# Patient Record
Sex: FEMALE | Race: BLACK OR AFRICAN AMERICAN | Employment: OTHER | URBAN - METROPOLITAN AREA
[De-identification: names, ages, dates, MRNs, and addresses within clinical notes are randomized per-mention and may not be internally consistent; named-entity substitution may affect disease eponyms.]

---

## 2017-01-17 ENCOUNTER — OFFICE VISIT (OUTPATIENT)
Dept: INTERNAL MEDICINE CLINIC | Age: 57
End: 2017-01-17

## 2017-01-17 VITALS
HEART RATE: 85 BPM | DIASTOLIC BLOOD PRESSURE: 84 MMHG | OXYGEN SATURATION: 99 % | SYSTOLIC BLOOD PRESSURE: 146 MMHG | WEIGHT: 184.2 LBS | BODY MASS INDEX: 32.64 KG/M2 | TEMPERATURE: 98.4 F | HEIGHT: 63 IN | RESPIRATION RATE: 16 BRPM

## 2017-01-17 DIAGNOSIS — E55.9 VITAMIN D DEFICIENCY: ICD-10-CM

## 2017-01-17 DIAGNOSIS — R73.03 PREDIABETES: ICD-10-CM

## 2017-01-17 DIAGNOSIS — E66.9 OBESITY (BMI 30-39.9): ICD-10-CM

## 2017-01-17 DIAGNOSIS — E78.2 MIXED HYPERLIPIDEMIA: ICD-10-CM

## 2017-01-17 DIAGNOSIS — Z23 NEED FOR TDAP VACCINATION: ICD-10-CM

## 2017-01-17 DIAGNOSIS — Z00.00 WELL WOMAN EXAM (NO GYNECOLOGICAL EXAM): Primary | ICD-10-CM

## 2017-01-17 RX ORDER — ERGOCALCIFEROL 1.25 MG/1
50000 CAPSULE ORAL
Qty: 8 CAP | Refills: 0 | Status: SHIPPED | OUTPATIENT
Start: 2017-01-17 | End: 2017-03-14

## 2017-01-17 NOTE — PROGRESS NOTES
Patient received Tdap in office today. Administered Boostrix 0.5 ml in left deltoid, IM. Pt tolerated well.

## 2017-01-17 NOTE — PROGRESS NOTES
HISTORY OF PRESENT ILLNESS  Yvonne Márquez is a 56 y.o. female. HPI  Health Maintenance   Topic Date Due    DTaP/Tdap/Td series (1 - Tdap) 01/14/1983    BREAST CANCER SCRN MAMMOGRAM  03/10/2016    INFLUENZA AGE 9 TO ADULT  08/01/2016    FOBT Q 1 YEAR AGE 50-75  11/09/2017    PAP AKA CERVICAL CYTOLOGY  09/15/2019    Hepatitis C Screening  Completed     Cardiovascular Review:  The patient has obesity Body mass index is 32.47 kg/(m^2). due to excess calories   She is ready to change her lifestyle and had made the following changes:  -She has joined a gym with sons plans to go 3 weeks   -She plans to change her eating habits   Diet and Lifestyle: nonsmoker  Home BP Monitoring: is not measured at home. Pertinent ROS: no TIA's, no chest pain on exertion, no dyspnea on exertion, no swelling of ankles. Review of Systems   Constitutional: Negative for chills, fever and weight loss. HENT: Negative for congestion and sore throat. Eyes: Negative for blurred vision and double vision. Respiratory: Negative for cough and shortness of breath. Cardiovascular: Negative for chest pain, orthopnea and leg swelling. Gastrointestinal: Negative for abdominal pain, blood in stool, constipation, diarrhea, heartburn, nausea and vomiting. Genitourinary: Negative for frequency and urgency. Musculoskeletal: Negative for joint pain and myalgias. Neurological: Negative for dizziness, tremors, weakness and headaches. Endo/Heme/Allergies: Does not bruise/bleed easily. Psychiatric/Behavioral: Negative for depression and suicidal ideas.      Patient Active Problem List    Diagnosis Date Noted    Obesity (BMI 30-39.9) 10/26/2015       Current Outpatient Prescriptions   Medication Sig Dispense Refill    DIVIGEL 1 mg (0.1 %) glpk USE 1 PACKET AS DIRECTED  11    PREMARIN 0.625 mg/gram vaginal cream INSERT 0.5GRAM VAGINALLY TWICE WEEKLY  1       Allergies   Allergen Reactions    Asa-Acetaminophen-Caff-Buffers Nausea and Vomiting    Pcn [Penicillins] Hives      Visit Vitals    /84 (BP 1 Location: Right arm, BP Patient Position: Sitting)    Pulse 85    Temp 98.4 °F (36.9 °C) (Oral)    Resp 16    Ht 5' 3.15\" (1.604 m)    Wt 184 lb 3.2 oz (83.6 kg)    SpO2 99%    BMI 32.47 kg/m2       Physical Exam   Constitutional: She is oriented to person, place, and time. She appears well-developed and well-nourished. HENT:   Right Ear: External ear normal.   Left Ear: External ear normal.   Mouth/Throat: Oropharynx is clear and moist. No oropharyngeal exudate. Eyes: Conjunctivae are normal. No scleral icterus. Neck: Normal range of motion. Neck supple. No thyromegaly present. Cardiovascular: Normal rate, regular rhythm and normal heart sounds. Exam reveals no gallop and no friction rub. No murmur heard. Pulmonary/Chest: Effort normal and breath sounds normal. No respiratory distress. She has no wheezes. She has no rales. She exhibits no tenderness. Abdominal: Soft. Bowel sounds are normal. She exhibits no distension. There is no tenderness. There is no rebound and no guarding. Genitourinary:   Genitourinary Comments: Up to date, completed by gynecologist.       Musculoskeletal: Normal range of motion. She exhibits no edema or tenderness. Neurological: She is alert and oriented to person, place, and time. Psychiatric: She has a normal mood and affect. ASSESSMENT and PLAN  Christie Mares was seen today for well woman. Diagnoses and all orders for this visit:    Well woman exam (no gynecological exam)- Health Maintenance reviewed - mammogram ordered, patient to schedule appointment, tetanus booster given. Labs reviewed     -     [facility] MAMMO BI SCREENING INCL CAD; Future    Obesity (BMI 30-39. 9)- I have reviewed/discussed the above normal BMI with the patient. I have recommended the following interventions: dietary management education, guidance, and counseling .  The plan is as follows: I have counseled this patient on diet and exercise regimens. .        Need for Tdap vaccination- given in clinic  -     TETANUS, DIPHTHERIA TOXOIDS AND ACELLULAR PERTUSSIS VACCINE (TDAP), IN INDIVIDS. >=7, IM  -     OR IMMUNIZ ADMIN,1 SINGLE/COMB VAC/TOXOID    Vitamin D deficiency- -Your vitamin Dis  low--> I order high dose vitamin D which you will take weekly for 8 weeks. After 8 weeks start an over the counter vitamin D supplement 800-1000IU/ day. Prediabetes- optimize diet. See AVS for full details of plan and patient discussion. Mixed hyperlipidemia- trial of TLC. Statin not indicated. Elevated BP- no known HTN. Recheck       Follow-up Disposition:  Return in about 4 months (around 5/17/2017) for Follow up- A1c. Medication risks/benefits/costs/interactions/alternatives discussed with patient. Bremclisbet Cheyanne  was given an after visit summary which includes diagnoses, current medications, & vitals. she expressed understanding with the diagnosis and plan.

## 2017-01-17 NOTE — PATIENT INSTRUCTIONS
It was a pleasure to see you! As discussed:    Lab Review  -Your A1c or 3 month marker of your blood sugar is mildly elevated, indicating prediabetes we will recheck this level at your next appointment. In the meantime work on optimizing your diet as we discussed at your appointment.   -Your cholesterol is elevated but fortunately based on your risk factors a statin is not indicated. Continue to work on optimizing your weight (goal lose at least 5% body weight), healthy eating and cardiovascular disease (Recommendation: reduce carbs to 150-200g/ day and the Mediterranean Diet). -Your vitamin Dis  low--> I order high dose vitamin D which you will take weekly for 8 weeks. After 8 weeks start an over the counter vitamin D supplement 800-1000IU/ day. The remainder of your labs were normal. Some labs that may have been tested and their explanation are:  Your electrolytes, kidney & liver function (Metabolic Panel)   Anemia, blood cells (CBC)  Thyroid (TSH + T4, T3)  Hormones (prolactin, vitamin D )   Hepatitis C AB (Hepatitis C Screening)    You received the tetanus/pertussis vaccine today. Please see vaccine handout for more information and let us know if you have any reactions or concerns after the vaccination. I'm excited you are committing to a healthier lifestyle. Here are some tips to help you:   Recommended \"Diets\"  Choose a healthy eating plan that works best for you. Some ideas are:  Whole 30 Diet: http://whole30.com/  Mediterranean Diet: ResidentialBook.de  Low Carb Diet: LifeHead.nl  Fast Metabolism Diet: http://[employer]/books/the-fast-metabolism-diet/  If you are not following one of these diets.   -Calorie counting is the best strategy for long term weight loss.  Until you are ready to start you can use Rolf's Ladder as a guide to what foods to eat for weight loss. - When you start counting calories, I recommend using an jericho such as MyMicroEvalPal.com or LoseIt.com  -Week one: record all your calories without altering your diet. Week 2: decrease your calories by 500 calories, continue each week till you at at your goal calorie intake 2286-0537 calories. -Go to the website: chooseResonergy.[employer] or health.gov to find more healthy eating tips      Well Visit, Women 50 to 72: Care Instructions  Your Care Instructions  Physical exams can help you stay healthy. Your doctor has checked your overall health and may have suggested ways to take good care of yourself. He or she also may have recommended tests. At home, you can help prevent illness with healthy eating, regular exercise, and other steps. Follow-up care is a key part of your treatment and safety. Be sure to make and go to all appointments, and call your doctor if you are having problems. It's also a good idea to know your test results and keep a list of the medicines you take. How can you care for yourself at home? · Reach and stay at a healthy weight. This will lower your risk for many problems, such as obesity, diabetes, heart disease, and high blood pressure. · Get at least 30 minutes of exercise on most days of the week. Walking is a good choice. You also may want to do other activities, such as running, swimming, cycling, or playing tennis or team sports. · Do not smoke. Smoking can make health problems worse. If you need help quitting, talk to your doctor about stop-smoking programs and medicines. These can increase your chances of quitting for good. · Protect your skin from too much sun. When you're outdoors from 10 a.m. to 4 p.m., stay in the shade or cover up with clothing and a hat with a wide brim. Wear sunglasses that block UV rays. Even when it's cloudy, put broad-spectrum sunscreen (SPF 30 or higher) on any exposed skin.   · See a dentist one or two times a year for checkups and to have your teeth cleaned. · Wear a seat belt in the car. · Limit alcohol to 1 drink a day. Too much alcohol can cause health problems. Follow your doctor's advice about when to have certain tests. These tests can spot problems early. · Cholesterol. Your doctor will tell you how often to have this done based on your age, family history, or other things that can increase your risk for heart attack and stroke. · Blood pressure. Have your blood pressure checked during a routine doctor visit. Your doctor will tell you how often to check your blood pressure based on your age, your blood pressure results, and other factors. · Mammogram. Ask your doctor how often you should have a mammogram, which is an X-ray of your breasts. A mammogram can spot breast cancer before it can be felt and when it is easiest to treat. · Pap test and pelvic exam. Ask your doctor how often you should have a Pap test. You may not need to have a Pap test as often as you used to. · Vision. Have your eyes checked every year or two or as often as your doctor suggests. Some experts recommend that you have yearly exams for glaucoma and other age-related eye problems starting at age 48. · Hearing. Tell your doctor if you notice any change in your hearing. You can have tests to find out how well you hear. · Diabetes. Ask your doctor whether you should have tests for diabetes. · Colon cancer. You should begin tests for colon cancer at age 48. You may have one of several tests. Your doctor will tell you how often to have tests based on your age and risk. Risks include whether you already had a precancerous polyp removed from your colon or whether your parents, sisters and brothers, or children have had colon cancer. · Thyroid disease. Talk to your doctor about whether to have your thyroid checked as part of a regular physical exam. Women have an increased chance of a thyroid problem. · Osteoporosis.  You should begin tests for bone density at age 72. If you are younger than 72, ask your doctor whether you have factors that may increase your risk for this disease. You may want to have this test before age 72. · Heart attack and stroke risk. At least every 4 to 6 years, you should have your risk for heart attack and stroke assessed. Your doctor uses factors such as your age, blood pressure, cholesterol, and whether you smoke or have diabetes to show what your risk for a heart attack or stroke is over the next 10 years. When should you call for help? Watch closely for changes in your health, and be sure to contact your doctor if you have any problems or symptoms that concern you. Where can you learn more? Go to http://kahlil-ale.info/. Enter V699 in the search box to learn more about \"Well Visit, Women 50 to 72: Care Instructions. \"  Current as of: July 19, 2016  Content Version: 11.1  © 3815-9287 [employer]. Care instructions adapted under license by [employer] (which disclaims liability or warranty for this information). If you have questions about a medical condition or this instruction, always ask your healthcare professional. [place] [address] any warranty or liability for your use of this information.

## 2017-01-30 ENCOUNTER — HOSPITAL ENCOUNTER (OUTPATIENT)
Dept: MAMMOGRAPHY | Age: 57
Discharge: HOME | End: 2017-01-30
Attending: INTERNAL MEDICINE
Payer: COMMERCIAL

## 2017-01-30 DIAGNOSIS — Z00.00 WELL WOMAN EXAM (NO GYNECOLOGICAL EXAM): ICD-10-CM

## 2017-01-30 PROCEDURE — 77067 SCR MAMMO BI INCL CAD: CPT

## 2017-06-01 ENCOUNTER — OFFICE VISIT (OUTPATIENT)
Dept: INTERNAL MEDICINE CLINIC | Age: 57
End: 2017-06-01

## 2017-06-01 VITALS
TEMPERATURE: 97.8 F | DIASTOLIC BLOOD PRESSURE: 68 MMHG | SYSTOLIC BLOOD PRESSURE: 118 MMHG | BODY MASS INDEX: 29.16 KG/M2 | HEIGHT: 63 IN | RESPIRATION RATE: 14 BRPM | WEIGHT: 164.6 LBS | OXYGEN SATURATION: 99 % | HEART RATE: 60 BPM

## 2017-06-01 DIAGNOSIS — R73.09 ELEVATED HEMOGLOBIN A1C: ICD-10-CM

## 2017-06-01 DIAGNOSIS — E78.2 MIXED HYPERLIPIDEMIA: Primary | ICD-10-CM

## 2017-06-01 DIAGNOSIS — R63.4 WEIGHT LOSS: ICD-10-CM

## 2017-06-01 LAB — HBA1C MFR BLD HPLC: 5.6 %

## 2017-06-01 NOTE — PROGRESS NOTES
Chief Complaint   Patient presents with    Cholesterol Problem     1. Have you been to the ER, urgent care clinic since your last visit? Hospitalized since your last visit? Yes When: 5/2017 [facility] Reason for visit: Sinus Infection    2. Have you seen or consulted any other health care providers outside of the [address] [place] [address] since your last visit? Include any pap smears or colon screening.  No    Dark spot on upper left calf

## 2017-06-01 NOTE — PROGRESS NOTES
HISTORY OF PRESENT ILLNESS  Lauro Childress is a 56 y.o. female. HPI  Cardiovascular Review:  The patient has prediabetes, HLD and Body mass index is 29.02 kg/(m^2). lost 20lbs. Diet and Lifestyle: follows a diabetic diet regularly, exercises regularly, nonsmoker  Home BP Monitoring: is not measured at home. Pertinent ROS: no TIA's, no chest pain on exertion, no dyspnea on exertion, no swelling of ankles. Review of Systems   Constitutional: Negative for diaphoresis, fever and weight loss. HENT:        Recovered frm Sinusitis seen at patient    Eyes: Negative for blurred vision and pain. Respiratory: Negative for shortness of breath. Cardiovascular: Negative for chest pain, orthopnea and leg swelling. Skin:        Left leg hematoma- tender   Neurological: Negative for focal weakness and headaches. Endo/Heme/Allergies: Bruises/bleeds easily. Psychiatric/Behavioral: Negative for depression. Patient Active Problem List    Diagnosis Date Noted    Prediabetes 01/17/2017    Mixed hyperlipidemia 01/17/2017    Obesity (BMI 30-39.9) 10/26/2015       Current Outpatient Prescriptions   Medication Sig Dispense Refill    DIVIGEL 1 mg (0.1 %) glpk USE 1 PACKET AS DIRECTED  11    PREMARIN 0.625 mg/gram vaginal cream INSERT 0.5GRAM VAGINALLY TWICE WEEKLY  1       Allergies   Allergen Reactions    Asa-Acetaminophen-Caff-Buffers Nausea and Vomiting    Pcn [Penicillins] Hives      Visit Vitals    /68 (BP 1 Location: Right arm, BP Patient Position: Sitting)    Pulse 60    Temp 97.8 °F (36.6 °C) (Oral)    Resp 14    Ht 5' 3.15\" (1.604 m)    Wt 164 lb 9.6 oz (74.7 kg)    SpO2 99%    BMI 29.02 kg/m2       Physical Exam   Constitutional: She is oriented to person, place, and time. She appears well-developed. No distress. Eyes: Conjunctivae are normal.   Neck: Neck supple. No thyromegaly present. Cardiovascular: Normal rate, regular rhythm and normal heart sounds.     Pulmonary/Chest: Effort normal and breath sounds normal. No respiratory distress. She has no wheezes. She has no rales. She exhibits no tenderness. Lymphadenopathy:     She has no cervical adenopathy. Neurological: She is alert and oriented to person, place, and time. Skin: Skin is warm. Psychiatric: She has a normal mood and affect. ASSESSMENT and PLAN  Lorene Love was seen today for cholesterol problem. Diagnoses and all orders for this visit:    Mixed hyperlipidemia- check lipids anticipate improvement give weight loss. -     LIPID PANEL  -     METABOLIC PANEL, COMPREHENSIVE    Elevated hemoglobin A1c- now wnl with lifestyle changes. -     AMB POC HEMOGLOBIN A1C    Weight loss- lost 20lbs via diet change. I have reviewed/discussed the above normal BMI with the patient. I have recommended the following interventions: dietary management education, guidance, and counseling . Mi Oden Follow-up Disposition:  Return in about 8 months (around 1/15/2018) for Physical - 30 minute appointment. Medication risks/benefits/costs/interactions/alternatives discussed with patient. Michel Finchciarra  was given an after visit summary which includes diagnoses, current medications, & vitals. she expressed understanding with the diagnosis and plan.

## 2017-06-01 NOTE — PATIENT INSTRUCTIONS
It was a pleasure to see you! As discussed:    Congratulations on your weight loss and positive lifestyle changes!!! Health Maintenance   I have ordered your age appropriate labs please complete them. You will need to fast 10-12hrs before your appointment. Great job on [employer] and exercising! Remember goal for exercise is 150minutes of moderate exercise a day and diet goal is to eat 50% fruits and vegetables with minimal sugar, fat and oil daily. See health.gov or choosemyplate.gov for more details. Your cervical cancer and breast cancer screening will be completed by your ob/ gyn as scheduled. Learning About Carbohydrate  What is carbohydrate? Carbohydrate is an important nutrient you get from food. It's a great source of energy for your body and helps your brain and nervous system work properly. How does your body use carbohydrate? After you eat food with carbs in it, your body digests the carbohydrate and turns it into a kind of sugar that goes into your blood. The blood carries this sugar to the cells in your body. The cells use the sugar to give you energy. Extra sugar is stored in the cells for later use. If it isn't used, it turns into fat. Where does carbohydrate come from? The healthiest carbohydrate choices are breads, cereals, and pastas made with whole grains; brown rice; low-fat dairy products; vegetables; legumes such as peas, lentils, and beans; and fruits. Foods made from refined flour, including bread, pasta, doughnuts, cookies, and desserts, also contain carbohydrate. So do sweets such as candy and soda. How can you get the right kind and amount of carbs? Eating too much of anything can lead to weight gain. And that can lead to other health problems. Here are some tips to help you eat the right amount of the right kind of carbs so you have the nutrition and the energy you need:  · Eat 3 to 8 servings of grains (breads, cereals, rice, pasta) each day.  For example, a serving is 1 slice of bread, 1 cup of boxed cereal, or ½ cup of cooked rice, cooked pasta, or cooked cereal. Go to www. choosemyplate.gov to learn how many servings you need. ¨ Buy bread that lists whole wheat (or other whole grains), stone-ground wheat, or cracked wheat as the first ingredient. ¨ Eat brown rice, bulgur, or millet instead of white rice. ¨ Eat pasta and cereals made from whole grain flour instead of refined flour. · Eat several servings a day of fresh fruits and vegetables. These include raspberries, apples, figs, oranges, pears, prunes, broccoli, brussels sprouts, carrots, corn, peas, and beans. And there are lots of other fruits and vegetables to choose from. · Limit the amount of candy, desserts, and soda in your diet. Where can you learn more? Go to http://kahlil-ale.info/. Enter F199 in the search box to learn more about \"Learning About Carbohydrate. \"  Current as of: July 26, 2016  Content Version: 11.2  © 8374-2959 [employer]. Care instructions adapted under license by [employer] (which disclaims liability or warranty for this information). If you have questions about a medical condition or this instruction, always ask your healthcare professional. [address] any warranty or liability for your use of this information.

## 2020-02-17 ENCOUNTER — HOSPITAL ENCOUNTER (OUTPATIENT)
Dept: MAMMOGRAPHY | Age: 60
Discharge: HOME | End: 2020-02-17
Attending: OBSTETRICS & GYNECOLOGY
Payer: COMMERCIAL

## 2020-02-17 DIAGNOSIS — Z12.31 VISIT FOR SCREENING MAMMOGRAM: ICD-10-CM

## 2020-02-17 PROCEDURE — 77067 SCR MAMMO BI INCL CAD: CPT

## 2020-10-30 ENCOUNTER — VIRTUAL VISIT (OUTPATIENT)
Dept: PRIMARY CARE CLINIC | Age: 60
End: 2020-10-30
Payer: COMMERCIAL

## 2020-10-30 DIAGNOSIS — R11.0 NAUSEA: ICD-10-CM

## 2020-10-30 DIAGNOSIS — R63.0 LOSS OF APPETITE: ICD-10-CM

## 2020-10-30 DIAGNOSIS — U07.1 COVID-19: Primary | ICD-10-CM

## 2020-10-30 DIAGNOSIS — J02.9 SORE THROAT: ICD-10-CM

## 2020-10-30 PROCEDURE — 99204 OFFICE O/P NEW MOD 45 MIN: CPT | Performed by: FAMILY MEDICINE

## 2020-10-30 RX ORDER — ONDANSETRON 4 MG/1
4 TABLET, ORALLY DISINTEGRATING ORAL
Qty: 10 TAB | Refills: 0 | Status: SHIPPED | OUTPATIENT
Start: 2020-10-30

## 2020-10-30 NOTE — PATIENT INSTRUCTIONS
Oral Rehydration: Care Instructions Your Care Instructions Dehydration occurs when your body loses too much water. This can happen if you do not drink enough fluids or lose a lot of fluid due to diarrhea, vomiting, or sweating. Being dehydrated can cause health problems and can even be life-threatening. To replace lost fluids, you need to drink liquid that contains special chemicals called electrolytes. Electrolytes keep your body working well. Plain water does not have electrolytes. You also need to rest to prevent more fluid loss. Replacing water and electrolytes (oral rehydration) completely takes about 36 hours. But you should feel better within a few hours. Follow-up care is a key part of your treatment and safety. Be sure to make and go to all appointments, and call your doctor if you are having problems. It's also a good idea to know your test results and keep a list of the medicines you take. How can you care for yourself at home? · Take frequent sips of a drink such as Gatorade, Powerade, or other rehydration drinks that your doctor suggests. These replace both fluid and important chemicals (electrolytes) you need for balance in your blood. · Drink 2 quarts of cool liquid over 2 to 4 hours. You should have at least 10 glasses of liquid a day to replace lost fluid. If you have kidney, heart, or liver disease and have to limit fluids, talk with your doctor before you increase the amount of fluids you drink. · Make your own drink. Measure everything carefully. The drink may not work well or may even be harmful if the amounts are off. ? 1 quart water ? ½ teaspoon salt ? 6 teaspoons sugar · Do not drink liquid with caffeine, such as coffee and wili. · Do not drink any alcohol. It can make you dehydrated. · Drink plenty of fluids, enough so that your urine is light yellow or clear like water.  If you have kidney, heart, or liver disease and have to limit fluids, talk with your doctor before you increase the amount of fluids you drink. When should you call for help? Call 911 anytime you think you may need emergency care. For example, call if: 
  · You have signs of severe dehydration, such as: 
? You are confused or unable to stay awake. 
? You passed out (lost consciousness). Call your doctor now or seek immediate medical care if: 
  · You still have signs of dehydration. You have sunken eyes and a dry mouth, and you pass only a little dark urine.  
  · You are dizzy or lightheaded, or you feel like you may faint.  
  · You are not able to keep down fluids. Watch closely for changes in your health, and be sure to contact your doctor if: 
  · You do not get better as expected. Where can you learn more? Go to http://www.gray.com/ Enter I040 in the search box to learn more about \"Oral Rehydration: Care Instructions. \" Current as of: June 26, 2019               Content Version: 12.6 © 1465-9845 [employer]. Care instructions adapted under license by [employer] (which disclaims liability or warranty for this information). If you have questions about a medical condition or this instruction, always ask your healthcare professional. [place] [address] any warranty or liability for your use of this information. 
  
 Learning About Coronavirus [phone]) Coronavirus (FHUVY-07): Overview What is coronavirus (RWBZP-67)? The coronavirus disease (COVID-19) is caused by a virus. It is an illness that was first found in December 2019. It has since spread worldwide. The virus can cause fever, cough, and trouble breathing. In severe cases, it can cause pneumonia and make it hard to breathe without help. It can cause death. This virus spreads person-to-person through droplets from coughing and sneezing.  It can also spread when you are close to someone who is infected. And it can spread when you touch something that has the virus on it, such as a doorknob or a tabletop. Coronaviruses are a large group of viruses. They cause the common cold. They also cause more serious illnesses like Middle East respiratory syndrome (MERS) and severe acute respiratory syndrome (SARS). COVID-19 is caused by a novel coronavirus. That means it's a new type that has not been seen in people before. How is COVID-19 treated? Mild illness can be treated at home, but more serious illness needs to be treated in the hospital. Treatment may include medicines to reduce symptoms, plus breathing support such as oxygen therapy or a ventilator. Other treatments, such as antiviral medicines, may help people who have COVID-19. What can you do to protect yourself from COVID-19? The best way to protect yourself from getting sick is to: · Avoid areas where there is an outbreak. · Avoid contact with people who may be infected. · Avoid crowds and try to stay at least 6 feet away from other people. · Wash your hands often, especially after you cough or sneeze. Use soap and water, and scrub for at least 20 seconds. If soap and water aren't available, use an alcohol-based hand . · Avoid touching your mouth, nose, and eyes. What can you do to avoid spreading the virus to others? To help avoid spreading the virus to others: 
· Wash your hands often with soap or alcohol-based hand sanitizers. · Cover your mouth with a tissue when you cough or sneeze. Then throw the tissue in the trash. · Use a disinfectant to clean things that you touch often. These include doorknobs, remote controls, phones, and handles on your refrigerator and microwave. And don't forget countertops, tabletops, bathrooms, and computer keyboards. · Wear a cloth face cover if you have to go to public areas. If you know or suspect that you have COVID-19: 
· Stay home. Don't go to school, work, or public areas.  And don't use public transportation, ride-shares, or taxis unless you have no choice. · Leave your home only if you need to get medical care or testing. But call the doctor's office first so they know you're coming. And wear a face cover. · Limit contact with people in your home. If possible, stay in a separate bedroom and use a separate bathroom. · Wear a face cover whenever you're around other people. It can help stop the spread of the virus when you cough or sneeze. · Clean and disinfect your home every day. Use household  and disinfectant wipes or sprays. Take special care to clean things that you grab with your hands. · Self-isolate until it's safe to be around others again. ? If you have symptoms, it's safe when you haven't had a fever for 3 days and your symptoms have improved and it's been at least 10 days since your symptoms started. ? If you were exposed to the virus but don't have symptoms, it's safe to be around others 14 days after exposure. ? Talk to your doctor about whether you also need testing, especially if you have a weakened immune system. When to call for help Call 911 anytime you think you may need emergency care. For example, call if: 
· You have severe trouble breathing. (You can't talk at all.) · You have constant chest pain or pressure. · You are severely dizzy or lightheaded. · You are confused or can't think clearly. · Your face and lips have a blue color. · You passed out (lost consciousness) or are very hard to wake up. Call your doctor now if you develop symptoms such as: · Shortness of breath. · Fever. · Cough. If you need to get care, call ahead to the doctor's office for instructions before you go. Make sure you wear a face cover to prevent exposing other people to the virus. Where can you get the latest information? The following health organizations are tracking and studying this virus. Their websites contain the most up-to-date information.  Valarie Hernandez also learn what to do if you think you may have been exposed to the virus. · U.S. Centers for Disease Control and Prevention (CDC): The CDC provides updated news about the disease and travel advice. The website also tells you how to prevent the spread of infection. www.cdc.gov · World Health Organization [facility]): WHO offers information about the virus outbreaks. WHO also has travel advice. www.who.int 
Current as of: July 10, 2020               Content Version: 12.6 © 2006-2020 [employer]. Care instructions adapted under license by [employer] (which disclaims liability or warranty for this information).  If you have questions about a medical condition or this instruction, always ask your healthcare professional. [address] any warranty or liability for your use of this information.

## 2020-10-30 NOTE — LETTER
NOTIFICATION RETURN TO WORK / SCHOOL 
 
10/30/2020 Ms. Getachew Borjas [address] [facility][note 3] [id number] To Whom It May Concern: 
 
Getachew Borjas was seen virtually by [facility] 10/30/2020. She may return to work on 11/9. I recommend: she not return to work until she has been fever free for 24 hours without antipyretics. If there are questions or concerns, please have the patient contact our office. Sincerely, Tony Harper, DO

## 2020-10-30 NOTE — PROGRESS NOTES
Juju Butler  64 y.o. female  1960  [facility]  [id number]   [facility]   Telemedicine Progress Note  Rebecca Campbell DO       Encounter Date and Time: October 30, 2020 at 2:44 PM    Consent: Juju Butler, who was seen by synchronous (real-time) audio-video technology, and/or her healthcare decision maker, is aware that this patient-initiated, Telehealth encounter on 10/30/2020 is a billable service, with coverage as determined by her insurance carrier. She is aware that she may receive a bill and has provided verbal consent to proceed: Yes. Chief Complaint   Patient presents with    Concern For COVID-19 (Coronavirus)     History of Present Illness   Juju Butler is a 64 y.o. female was evaluated by synchronous (real-time) audio-video technology from home, through a secure patient portal.    Woke up with sx last Monday 10/19. Sx include fatigue, fevers, overall feeling weak, loss of appetite. Received confirmation at [facility] Thursday 10/22 she was positive for [address].  was also diagnosed this same day but ha since recovered. He accompanied her on the video call and states he is most concerned about her appetite and nourishment. States she has been eating mainly crackers, ginger ale and occasional orange juice. Keeping 2-3 glasses of water down. States she is urinating but less. She states she has nausea and sore throat which makes her not want to eat. She has been taking Tylenol which brings down her fever and helps her sleep well. She has a cough. No history of asthma or lung disease. She is a non smoker. Denies any issues with chest pain, shortness of breath or trouble breathing, confusion. Needs a note for work.      PMH - pre hypertension, seasonal allergies  Surg Hx - hysterectomy  Fam Hx - Mom has diabetes, HTN  Social Hx - , non smoker, occasional drinker    Review of Systems   Review of Systems   Constitutional: Positive for fever and malaise/fatigue. HENT: Positive for congestion and sore throat. Respiratory: Positive for cough and sputum production. Negative for shortness of breath. Cardiovascular: Negative for chest pain. Gastrointestinal: Positive for nausea. Negative for blood in stool. Genitourinary: Negative for frequency, hematuria and urgency. Musculoskeletal: Positive for myalgias. Neurological: Positive for weakness. Endo/Heme/Allergies: Positive for environmental allergies. Vitals/Objective:     Temp 99.3  Is not able to check any other vitals at home    General: fatigued, cooperative, no distress   Mental  status: mental status: alert, oriented to person, place, and time, normal mood, behavior, speech, dress, motor activity, and thought processes, drowsy   Resp: resp: normal effort, no respiratory distress, no accessory muscle use and coughing - dry   Neuro: neuro: no gross deficits   Skin: skin: no discoloration or lesions of concern on visible areas   Due to this being a TeleHealth evaluation, many elements of the physical examination are unable to be assessed. Assessment and Plan:   Time-based coding, delete if not needed: I spent at least 30 minutes with this new patient, and >50% of the time was spent counseling and/or coordinating care regarding COVID 19    1. COVID 19 - discussed alarming signs and sx including difficulty breathing, chest pain, confusion, lethargy, difficulty arousing, turning blue she needs to go to ER immediately. Patient is able to eat and drink although less then normal and is urinating. Recommend she increase hydration to preferably 6 bottles of water daily and stick to bland BRAT diet. Discussed if she is not able to hydrate or keep fluids down she needs to go to ER for IVF. Will send Zofran to help with nausea. Recommend Cepacol for sore throat, continue Tylenol PRN fever/ myalgias, Claritin and Flonase for congestion sx.  Discussed per CDC guidelines since she has had a longer course she should quarantine 20 days since symptom onset and be 24 hours fever free without any antipyretics. Will send letter in [address] [place] [address] [address]. Discussed if her sx are worsening, she develops any of the red flag symptoms discussed above, develops signs of dehydration and/or is not able to stay hydrated or has decreased PO intake she needs to go to ER immediately. All questions/ concerns were addressed. Patient is agreeable to plan. Time spent in direct conversation with the patient to include medical condition(s) discussed, assessment and treatment plan:    We discussed the expected course, resolution and complications of the diagnosis(es) in detail. Medication risks, benefits, costs, interactions, and alternatives were discussed as indicated. I advised her to contact the office if her condition worsens, changes or fails to improve as anticipated. She expressed understanding with the diagnosis(es) and plan. Patient understands that this encounter was a temporary measure, and the importance of further follow up and examination was emphasized. Patient verbalized understanding. Patient informed to follow up: if sx are not improving on Monday or to go to ER if sx are worsening    Electronically Signed: Karla Soler DO Alexx Cummings is a 64 y.o. female who was evaluated by an audio-video encounter for concerns as above. Patient identification was verified prior to start of the visit. A caregiver was present when appropriate. Due to this being a TeleHealth encounter (During ACE- public health emergency), evaluation of the following organ systems was limited: Vitals/Constitutional/EENT/Resp/CV/GI//MS/Neuro/Skin/Heme-Lymph-Imm.   Pursuant to the emergency declaration under the [facility] waiver authority and the [employer] and [employer] Act, this Virtual Visit was conducted, with patient's (and/or legal guardian's) consent, to reduce the patient's risk of exposure to COVID-19 and provide necessary medical care. Services were provided through a synchronous discussion virtually to substitute for in-person clinic visit. I was in the office. The patient was at home. History   Patients past medical, surgical and family histories were reviewed and updated. Past Medical History:   Diagnosis Date    Environmental and seasonal allergies      Past Surgical History:   Procedure Laterality Date    HX GYN      hysterectomy      Family History   Problem Relation Age of Onset    Hypertension Mother     Diabetes Mother      Social History     Socioeconomic History    Marital status: UNKNOWN     Spouse name: Not on file    Number of children: Not on file    Years of education: Not on file    Highest education level: Not on file   Occupational History    Not on file   Social Needs    Financial resource strain: Not on file    Food insecurity     Worry: Not on file     Inability: Not on file    Transportation needs     Medical: Not on file     Non-medical: Not on file   Tobacco Use    Smoking status: Never Smoker    Smokeless tobacco: Never Used   Substance and Sexual Activity    Alcohol use:  Yes     Alcohol/week: 0.0 standard drinks     Comment: a few drinks a month    Drug use: Never    Sexual activity: Not on file   Lifestyle    Physical activity     Days per week: Not on file     Minutes per session: Not on file    Stress: Not on file   Relationships    Social connections     Talks on phone: Not on file     Gets together: Not on file     Attends Buddhism service: Not on file     Active member of club or organization: Not on file     Attends meetings of clubs or organizations: Not on file     Relationship status: Not on file    Intimate partner violence     Fear of current or ex partner: Not on file     Emotionally abused: Not on file     Physically abused: Not on file     Forced sexual activity: Not on file   Other Topics Concern    Not on file   Social History Narrative    Not on file     Patient Active Problem List   Diagnosis Code    Prediabetes R73.03    Mixed hyperlipidemia E78.2          Current Medications/Allergies   Medications and Allergies reviewed:    Current Outpatient Medications   Medication Sig Dispense Refill    ondansetron (ZOFRAN ODT) 4 mg disintegrating tablet Take 1 Tab by mouth every eight (8) hours as needed for Nausea or Vomiting.  10 Tab 0    DIVIGEL 1 mg (0.1 %) glpk USE 1 PACKET AS DIRECTED  11    PREMARIN 0.625 mg/gram vaginal cream INSERT 0.5GRAM VAGINALLY TWICE WEEKLY  1     Allergies   Allergen Reactions    Asa-Acetaminophen-Caff-Buffers Nausea and Vomiting    Pcn [Penicillins] Hives

## 2020-11-02 ENCOUNTER — TELEPHONE (OUTPATIENT)
Dept: PRIMARY CARE CLINIC | Age: 60
End: 2020-11-02

## 2020-11-02 NOTE — TELEPHONE ENCOUNTER
11/2/2020     Spoke with patient Friday evening. Was feeling better and had been able to eat/ drink after Zofran. Called to check in on her today. States she went to the ER Saturday morning for difficulty breathing. Was admitted to [facility] and is now starting to feel better.     Carol Garcia, DO

## 2020-11-03 ENCOUNTER — TELEPHONE (OUTPATIENT)
Dept: PRIMARY CARE CLINIC | Age: 60
End: 2020-11-03

## 2020-11-03 NOTE — TELEPHONE ENCOUNTER
Patients  wanted to let Dr. Simona Rutledge know his wife went to [facility] to be tested for covid and she is doing much better and will be discharged today.  Would like to speak to Dr. Simona Rutledge to follow up

## 2020-11-24 ENCOUNTER — TELEPHONE (OUTPATIENT)
Dept: PRIMARY CARE CLINIC | Age: 60
End: 2020-11-24

## 2020-11-24 NOTE — TELEPHONE ENCOUNTER
Called patient to get more information regarding her disability papers. Left VM to call back.     Tony Harper, DO

## 2020-11-30 ENCOUNTER — TELEPHONE (OUTPATIENT)
Dept: PRIMARY CARE CLINIC | Age: 60
End: 2020-11-30

## 2020-11-30 NOTE — TELEPHONE ENCOUNTER
----- Message from Josue Ozuna sent at 11/30/2020 12:21 PM EST -----  Regarding: Dr Stacey Velasco  Patient return call    Caller's first and last name and relationship (if not the patient):      Best contact number(s): [phone]      Whose call is being returned: Dr Chino Fleming      Details to clarify the request: Pt is requesting a call back from Dr Chino Fleming and advised she is retruning Dr Curiel Nations call.  Pt advised she is in [address] [place] [address] due to [facility] hospitalization since 10/31/20      Josue Ozuna

## 2020-12-02 ENCOUNTER — TELEPHONE (OUTPATIENT)
Dept: PRIMARY CARE CLINIC | Age: 60
End: 2020-12-02

## 2020-12-02 NOTE — TELEPHONE ENCOUNTER
Spoke with patient. She is still in a rehab facility which I was not aware of. States she was discharged from the hospital and went to rehab 11/6/20. States that she has an expected DC date of 12/23. Discussed I am happy to fill out her paper work. She stated I had permission to speak to her  as it is difficult for her to remember all the details. Attempted to call  but he did not . Left .

## 2020-12-02 NOTE — TELEPHONE ENCOUNTER
Spoke with  who clarified she has never been discharged from the hospital and is still at [facility] [address]. States the Attending physician at the hospital is also filling out the disability paperwork but they need a copy from us as well. Discussed this has been faxed. He states her CM is Ryan Bustamante [phone] and he will let her know we need records. Will give her a call tomorrow.      Mat Fox, DO

## 2020-12-17 ENCOUNTER — TELEPHONE (OUTPATIENT)
Dept: PRIMARY CARE CLINIC | Age: 60
End: 2020-12-17

## 2020-12-17 NOTE — TELEPHONE ENCOUNTER
Spoke with Jens Anderson from [place] [address] [address] who are coordinating her care on discharge from [facility] and will fax her DC summary to me when she is discharged 12/22. States she is still on oxygen. Requires skilled nursing, PT and OT at discharge in the home.      Kenny Cost, DO

## 2020-12-28 ENCOUNTER — VIRTUAL VISIT (OUTPATIENT)
Dept: PRIMARY CARE CLINIC | Age: 60
End: 2020-12-28
Payer: COMMERCIAL

## 2020-12-28 DIAGNOSIS — R53.81 PHYSICAL DECONDITIONING: ICD-10-CM

## 2020-12-28 DIAGNOSIS — R06.02 SHORTNESS OF BREATH: ICD-10-CM

## 2020-12-28 DIAGNOSIS — U07.1 COVID-19: Primary | ICD-10-CM

## 2020-12-28 PROCEDURE — 99214 OFFICE O/P EST MOD 30 MIN: CPT | Performed by: FAMILY MEDICINE

## 2020-12-28 NOTE — PROGRESS NOTES
Valentin Edward  64 y.o. female  1960  [facility]  [id number]   [facility]   Telemedicine Progress Note  Payton Rush DO       Encounter Date and Time: December 28, 2020 at 11:46 AM    Consent: Valentin Edward, who was seen by synchronous (real-time) audio-video technology, and/or her healthcare decision maker, is aware that this patient-initiated, Telehealth encounter on 12/28/2020 is a billable service, with coverage as determined by her insurance carrier. She is aware that she may receive a bill and has provided verbal consent to proceed: Yes. Chief Complaint   Patient presents with   [facility] Follow Up     History of Present Illness   Valentin Edward is a 64 y.o. female was evaluated by synchronous (real-time) audio-video technology from home, through a secure patient portal [place] me. [facility] Follow Up  Was diagnosed with COVID end of October. Was hospitalized for 2 months at [facility]. Patient is unsure of what tests were done. Is extremely deconditioned since she left the hospital. Has home health care coming into the home 3x weekly. Sees PT/ OT and skilled nursing. States PT is coming today. Is supposed to set up appt with Pulm but has not done this yet. States she does not have chest pain but is winded extremely easily. States she is taking it one step at a time but is grateful for the care she received and that she is doing better. Has good support system at home and her  is very involved in her care. States she still has headaches and occasional dizziness if she stands up too quickly. Has learned to stand up slower. Review of Systems   Review of Systems   Constitutional: Negative for chills and fever. Respiratory: Positive for shortness of breath and wheezing. Cardiovascular: Negative for chest pain. Neurological: Positive for dizziness and headaches.      Vitals/Objective:     General: alert, cooperative, no distress   Mental  status: mental status: alert, oriented to person, place, and time, normal mood, behavior, speech, dress, motor activity, and thought processes   Resp: resp: normal effort and no respiratory distress   Neuro: neuro: no gross deficits   Skin: skin: no discoloration or lesions of concern on visible areas   Due to this being a TeleHealth evaluation, many elements of the physical examination are unable to be assessed. Assessment and Plan:   Time-based coding, delete if not needed: I spent at least 25 minutes with this established patient, and >50% of the time was spent counseling and/or coordinating care regarding COVID 19, deconditioning, shortness of breath    1. COVID-19  - Still have not received records. Will reach out to home health care agency to see if we can coordinate this. Likely needs follow up CXR based on last imaging.   - Referral to Pulm and Cards for follow up  - Continue working with PT/ OT   Spoke with patient's  with patient's permission who is also unsure of what was done in the hospital regarding cardiac workup. They state they were told to follow up with Cards but seem unsure why. Spoke with Dr. Jolie Trevizo who will coordinate testing and appointments with patient and family. State [facility] and follow up testing. 2. Shortness of breath  - Likely needs repeat CXR depending on when last imaging was done if she had PNA in the hospital. She is off O2. States her pulse ox is stable 97+. Continue monitoring vitals at home. 3. Physical deconditioning  - Continue PT/ OT. Was recently discharged from Rehab facility. Has walker and wheelchair already at home. Good support system.     Time spent in direct conversation with the patient to include medical condition(s) discussed, assessment and treatment plan: 25 min (coordinating care with patient,  and Dr. Jolie Trevizo)    We discussed the expected course, resolution and complications of the diagnosis(es) in detail. Medication risks, benefits, costs, interactions, and alternatives were discussed as indicated. I advised her to contact the office if her condition worsens, changes or fails to improve as anticipated. She expressed understanding with the diagnosis(es) and plan. Patient understands that this encounter was a temporary measure, and the importance of further follow up and examination was emphasized. Patient verbalized understanding. Electronically Signed: DO Cielo Layton is a 64 y.o. female who was evaluated by an audio-video encounter for concerns as above. Patient identification was verified prior to start of the visit. A caregiver was present when appropriate. Due to this being a TeleHealth encounter (During ZOJK-19 public health emergency), evaluation of the following organ systems was limited: Vitals/Constitutional/EENT/Resp/CV/GI//MS/Neuro/Skin/Heme-Lymph-Imm. Pursuant to the emergency declaration under the [facility], 1135 waiver authority and the [employer] Act, this Virtual Visit was conducted, with patient's (and/or legal guardian's) consent, to reduce the patient's risk of exposure to COVID-19 and provide necessary medical care. Services were provided through a synchronous discussion virtually to substitute for in-person clinic visit. I was in the office. The patient was at home. History   Patients past medical, surgical and family histories were reviewed and updated.       Past Medical History:   Diagnosis Date    Environmental and seasonal allergies      Past Surgical History:   Procedure Laterality Date    HX GYN      hysterectomy      Family History   Problem Relation Age of Onset    Hypertension Mother     Diabetes Mother      Social History     Socioeconomic History    Marital status: UNKNOWN     Spouse name: Not on file    Number of children: Not on file    Years of education: Not on file    Highest education level: Not on file   Occupational History    Not on file   Social Needs    Financial resource strain: Not on file    Food insecurity     Worry: Not on file     Inability: Not on file    Transportation needs     Medical: Not on file     Non-medical: Not on file   Tobacco Use    Smoking status: Never Smoker    Smokeless tobacco: Never Used   Substance and Sexual Activity    Alcohol use: Yes     Alcohol/week: 0.0 standard drinks     Comment: a few drinks a month    Drug use: Never    Sexual activity: Not on file   Lifestyle    Physical activity     Days per week: Not on file     Minutes per session: Not on file    Stress: Not on file   Relationships    Social connections     Talks on phone: Not on file     Gets together: Not on file     Attends Sabianism service: Not on file     Active member of club or organization: Not on file     Attends meetings of clubs or organizations: Not on file     Relationship status: Not on file    Intimate partner violence     Fear of current or ex partner: Not on file     Emotionally abused: Not on file     Physically abused: Not on file     Forced sexual activity: Not on file   Other Topics Concern    Not on file   Social History Narrative    Not on file     Patient Active Problem List   Diagnosis Code    Prediabetes R73.03    Mixed hyperlipidemia E78.2          Current Medications/Allergies   Medications and Allergies reviewed:    Current Outpatient Medications   Medication Sig Dispense Refill    ondansetron (ZOFRAN ODT) 4 mg disintegrating tablet Take 1 Tab by mouth every eight (8) hours as needed for Nausea or Vomiting.  10 Tab 0    DIVIGEL 1 mg (0.1 %) glpk USE 1 PACKET AS DIRECTED  11    PREMARIN 0.625 mg/gram vaginal cream INSERT 0.5GRAM VAGINALLY TWICE WEEKLY  1     Allergies   Allergen Reactions    Asa-Acetaminophen-Caff-Buffers Nausea and Vomiting    Pcn [Penicillins] Hives

## 2020-12-29 ENCOUNTER — TELEPHONE (OUTPATIENT)
Dept: CARDIOLOGY CLINIC | Age: 60
End: 2020-12-29

## 2021-01-07 ENCOUNTER — OFFICE VISIT (OUTPATIENT)
Dept: CARDIOLOGY CLINIC | Age: 61
End: 2021-01-07
Payer: COMMERCIAL

## 2021-01-07 DIAGNOSIS — R06.09 DOE (DYSPNEA ON EXERTION): Primary | ICD-10-CM

## 2021-01-07 DIAGNOSIS — Z86.16 HISTORY OF COVID-19: ICD-10-CM

## 2021-01-07 DIAGNOSIS — R01.1 SYSTOLIC MURMUR: ICD-10-CM

## 2021-01-07 DIAGNOSIS — E78.2 MIXED HYPERLIPIDEMIA: ICD-10-CM

## 2021-01-07 DIAGNOSIS — G93.31 POSTVIRAL FATIGUE SYNDROME: ICD-10-CM

## 2021-01-07 PROCEDURE — 93000 ELECTROCARDIOGRAM COMPLETE: CPT | Performed by: SPECIALIST

## 2021-01-07 PROCEDURE — 99244 OFF/OP CNSLTJ NEW/EST MOD 40: CPT | Performed by: SPECIALIST

## 2021-01-07 RX ORDER — ASCORBIC ACID 500 MG
TABLET ORAL
COMMUNITY
End: 2021-01-22 | Stop reason: SDUPTHER

## 2021-01-07 RX ORDER — UREA 10 %
1 LOTION (ML) TOPICAL DAILY
COMMUNITY
Start: 2020-12-16 | End: 2021-05-10

## 2021-01-07 RX ORDER — HYDROCORTISONE 20 MG/1
1 TABLET ORAL 2 TIMES DAILY
COMMUNITY
Start: 2020-12-16 | End: 2021-01-22 | Stop reason: SDUPTHER

## 2021-01-07 RX ORDER — PANTOPRAZOLE SODIUM 40 MG/1
1 TABLET, DELAYED RELEASE ORAL DAILY
COMMUNITY
Start: 2020-12-16 | End: 2021-01-22 | Stop reason: SDUPTHER

## 2021-01-07 RX ORDER — MELATONIN
DAILY
COMMUNITY
End: 2021-01-22 | Stop reason: SDUPTHER

## 2021-01-07 NOTE — PROGRESS NOTES
Cristofer Basurto     1960       Maikel York MD, [facility]  Date of Visit-1/7/2021   PCP is DO Remi Cox [facility] [place]  [facility]  HPI:  Cristofer Basurto is a 64 y.o. female   New patient consultation from Dr. Naty Rodas. Pt with hx of COVID in October. Had a 2 month hospitalization in [facility] and has persistent fatigue and MORA. She has had some HTN and dizziness. Pt ambulates with a walker. Pt is accompanied by her . Pt states she feels  better compared to how she felt during her visit with her PCP. There is starting to be an improvement in her MORA as she gets further out from the infection. She notes that when she tries to carry on a long conversation or walk short distances she still gets short of breath. She has not noticed any LE edema. She was not on a ventilator but she was on BiPAP in the hospital. She was in the hospital for a total of 53 days. She does not believe she has any known CV complications from COVID. The hospital prescribed her steroids and she is still taking the same dosage now. She has seasonal allergies that cause her to wheeze. Denies chest pain, edema, syncope, has no tachycardia, palpitations or sense of arrhythmia. EKG: SR WNL rate 77. Intervals are  QRS 90 and QTc 403 ms. Assessment/Plan:    Patient is post Covid with MORA. Appears short of breath. Does not have edema. She does have a systolic murmur and recent COVID infection. I will get an echo, labs, and an x-ray. If her SOB is persistent we may need to consider cardiac MRI . There are cases of post Covid and during Covid of  myocarditis which typically manifest itself is reduced ejection fraction. We have also seen many infections with associated mild troponin bump sound like to get the records from the previous hospitalization.       She seems to have mainly respiratory symptoms which are largely a result of Covid and should follow-up with pulmonary especially on the dosing of steroids going forward. I assume that she will be on a wean but she was not familiar with what her dosing is supposed to be after hospitalization. F/u by VV in 3 weeks  Patient Instructions   Please have your blood work and chest xray completed. You have been scheduled for an echocardiogram. [facility] to [facility], please call them at [phone] if you do not hear from them in 48 hours. We will see you back in about 3 weeks with a virtual visit. Future Appointments   Date Time Provider Carrie Celia   1/28/2021 10:20 AM Maikel Ohara MD [facility]          Impression:   1. MORA (dyspnea on exertion)    2. Systolic murmur    3. History of COVID-19    4. Postviral fatigue syndrome    5. Mixed hyperlipidemia       Cardiac History:   No specialty comments available. Review of Systems   Constitutional: Positive for fever and malaise/fatigue. Negative for diaphoresis. Night sweats   HENT: Negative for ear pain, hearing loss, nosebleeds and tinnitus. Eyes: Negative for blurred vision, double vision and pain. Respiratory: Positive for cough, shortness of breath and wheezing. Negative for hemoptysis, sputum production and stridor. Cardiovascular: Negative for chest pain, palpitations, orthopnea, claudication and leg swelling. Gastrointestinal: Positive for diarrhea. Negative for abdominal pain, blood in stool, constipation, heartburn, nausea and vomiting. Genitourinary: Negative for dysuria, frequency and urgency. Chronic menstrual difficulties LMP 2000   Musculoskeletal: Positive for back pain and joint pain. Negative for falls. Skin: Negative for rash. Neurological: Negative for dizziness, seizures, loss of consciousness, weakness and headaches. Endo/Heme/Allergies: Does not bruise/bleed easily. Psychiatric/Behavioral: Negative for depression, hallucinations and memory loss.  The patient is nervous/anxious and has insomnia. see supplement sheet, initialed and to be scanned by staff  Past Medical History:   Diagnosis Date    Environmental and seasonal allergies    Covid infection on 10/22/2020 admitted 10/31/2022  [address]  History of hysterectomy /2 pregnancies. Social Hx= reports that she has never smoked. She has never used smokeless tobacco. She reports current alcohol use. She reports that she does not use drugs. Works as a   2 sons well enjoys reading sitting cooking and running drinks less than 3 alcoholic drinks a month drinks 2 cups of coffee a daily and does not smoke    Family history mother 66 in good health, father  at 58 of liver failure. 2 siblings in good health. Allergy aspirin causes nausea penicillin causes nausea and close throat      Exam and Labs:  BP (!) 134/90 (BP 1 Location: Right arm, BP Patient Position: Sitting)   Pulse 76   Resp 14   Ht 5' 3.15\" (1.604 m)   Wt 198 lb (89.8 kg)   SpO2 98%   BMI 34.91 kg/m² Constitutional:  NAD, comfortable  Head: NC,AT. Eyes: No scleral icterus. Neck:  Neck supple. No JVD present. Throat: moist mucous membranes. Chest: Effort normal & normal respiratory excursion . Neurological: alert, conversant and oriented . Skin: Skin is not cold. No obvious systemic rash noted. Not diaphoretic. No erythema. Psychiatric:  Grossly normal mood and affect. Behavior appears normal. Extremities:  no clubbing or cyanosis. Abdomen: non distended    Lungs:breath sounds normal. No stridor. distress, wheezes or  Rales. LMDMS:0/9 systolic murmur RUSB to LUSB normal rate, regular rhythm, normal S1, S2, no rubs, clicks or gallops , PMI non displaced. Edema: Edema is none.   Lab Results   Component Value Date/Time    Cholesterol, total 217 (H) 2017 09:59 AM    HDL Cholesterol 70 2017 09:59 AM    LDL, calculated 131 (H) 2017 09:59 AM    Triglyceride 78 2017 09:59 AM     Lab Results Component Value Date/Time    Sodium 140 01/13/2017 09:59 AM    Potassium 3.9 01/13/2017 09:59 AM    Chloride 101 01/13/2017 09:59 AM    CO2 23 01/13/2017 09:59 AM    Glucose 96 01/13/2017 09:59 AM    BUN 11 01/13/2017 09:59 AM    Creatinine 0.89 01/13/2017 09:59 AM    BUN/Creatinine ratio 12 01/13/2017 09:59 AM    GFR est AA 85 01/13/2017 09:59 AM    GFR est non-AA 74 01/13/2017 09:59 AM    Calcium 9.3 01/13/2017 09:59 AM      Wt Readings from Last 3 Encounters:   01/07/21 198 lb (89.8 kg)   06/01/17 164 lb 9.6 oz (74.7 kg)   01/17/17 184 lb 3.2 oz (83.6 kg)      BP Readings from Last 3 Encounters:   01/07/21 (!) 134/90   06/01/17 118/68   01/17/17 146/84      Current Outpatient Medications   Medication Sig    cholecalciferol (Vitamin D3) (1000 Units /25 mcg) tablet Take  by mouth daily.  ascorbic acid, vitamin C, (Vitamin C) 500 mg tablet Take  by mouth.  pantoprazole (PROTONIX) 40 mg tablet Take 1 Tab by mouth daily.  hydrocortisone (CORTEF) 20 mg tablet Take 1 Tab by mouth two (2) times a day.  zinc sulfate 220 mg tablet Take 1 Tab by mouth daily.  ondansetron (ZOFRAN ODT) 4 mg disintegrating tablet Take 1 Tab by mouth every eight (8) hours as needed for Nausea or Vomiting.  DIVIGEL 1 mg (0.1 %) glpk USE 1 PACKET AS DIRECTED    PREMARIN 0.625 mg/gram vaginal cream INSERT 0.5GRAM VAGINALLY TWICE WEEKLY    ZINC PO Take 1 Tab by mouth daily. No current facility-administered medications for this visit. Impression see above.       Written by Andrew Jewell, as dictated by Delbert Pineda MD.

## 2021-01-07 NOTE — Clinical Note
1/7/2021 
 
Patient: Blanca Vaughn  
YOB: 1960  
Date of Visit: 1/7/2021  
 
Joselyn Taylor DO 
[address] [address] 
Via In Basket 
 
Dear Joselyn Taylor DO, 
 
 
Thank you for referring Ms. Blanca Vaughn to [facility] for evaluation. My notes for this consultation are attached. 
 
If you have questions, please do not hesitate to call me. I look forward to following your patient along with you. 
 
 
Sincerely, 
 
Maikel Ohara MD

## 2021-01-07 NOTE — PATIENT INSTRUCTIONS
Please have your blood work and chest xray completed. You have been scheduled for an echocardiogram. A referral has been placed to [facility], please call them at [phone] if you do not hear from them in 48 hours. We will see you back in about 3 weeks with a virtual visit.

## 2021-01-08 VITALS
SYSTOLIC BLOOD PRESSURE: 134 MMHG | RESPIRATION RATE: 24 BRPM | HEART RATE: 76 BPM | DIASTOLIC BLOOD PRESSURE: 90 MMHG | HEIGHT: 63 IN | BODY MASS INDEX: 35.08 KG/M2 | OXYGEN SATURATION: 98 % | WEIGHT: 198 LBS

## 2021-01-13 ENCOUNTER — HOSPITAL ENCOUNTER (OUTPATIENT)
Dept: GENERAL RADIOLOGY | Age: 61
Discharge: HOME | End: 2021-01-13
Attending: SPECIALIST
Payer: COMMERCIAL

## 2021-01-13 DIAGNOSIS — R06.02 SHORTNESS OF BREATH: ICD-10-CM

## 2021-01-13 DIAGNOSIS — R06.09 DOE (DYSPNEA ON EXERTION): ICD-10-CM

## 2021-01-13 PROCEDURE — 71046 X-RAY EXAM CHEST 2 VIEWS: CPT

## 2021-01-15 ENCOUNTER — ANCILLARY PROCEDURE (OUTPATIENT)
Dept: CARDIOLOGY CLINIC | Age: 61
End: 2021-01-15
Payer: COMMERCIAL

## 2021-01-15 VITALS
SYSTOLIC BLOOD PRESSURE: 134 MMHG | DIASTOLIC BLOOD PRESSURE: 90 MMHG | HEIGHT: 63 IN | BODY MASS INDEX: 35.08 KG/M2 | WEIGHT: 198 LBS

## 2021-01-15 DIAGNOSIS — R01.1 SYSTOLIC MURMUR: ICD-10-CM

## 2021-01-15 DIAGNOSIS — R06.09 DOE (DYSPNEA ON EXERTION): ICD-10-CM

## 2021-01-15 DIAGNOSIS — Z86.16 HISTORY OF COVID-19: ICD-10-CM

## 2021-01-15 DIAGNOSIS — R06.02 SHORTNESS OF BREATH: ICD-10-CM

## 2021-01-15 PROCEDURE — 93306 TTE W/DOPPLER COMPLETE: CPT | Performed by: SPECIALIST

## 2021-01-16 PROBLEM — R01.1 SYSTOLIC MURMUR: Status: ACTIVE | Noted: 2021-01-16

## 2021-01-16 PROBLEM — R06.09 DOE (DYSPNEA ON EXERTION): Status: ACTIVE | Noted: 2021-01-16

## 2021-01-16 PROBLEM — Z86.16 HISTORY OF COVID-19: Status: ACTIVE | Noted: 2021-01-16

## 2021-01-16 PROBLEM — G93.31 POSTVIRAL FATIGUE SYNDROME: Status: ACTIVE | Noted: 2021-01-16

## 2021-01-22 ENCOUNTER — TELEPHONE (OUTPATIENT)
Dept: PRIMARY CARE CLINIC | Age: 61
End: 2021-01-22

## 2021-01-22 RX ORDER — ASCORBIC ACID 500 MG
500 TABLET ORAL 3 TIMES DAILY
Qty: 30 TAB | Refills: 0 | Status: SHIPPED | OUTPATIENT
Start: 2021-01-22 | End: 2021-05-10

## 2021-01-22 RX ORDER — MELATONIN
2000 DAILY
Qty: 60 TAB | Refills: 0 | Status: SHIPPED | OUTPATIENT
Start: 2021-01-22

## 2021-01-22 RX ORDER — HYDROCORTISONE 20 MG/1
20 TABLET ORAL 2 TIMES DAILY
Qty: 10 TAB | Refills: 0 | Status: SHIPPED | OUTPATIENT
Start: 2021-01-22 | End: 2021-01-29 | Stop reason: SDUPTHER

## 2021-01-22 RX ORDER — PANTOPRAZOLE SODIUM 40 MG/1
40 TABLET, DELAYED RELEASE ORAL DAILY
Qty: 30 TAB | Refills: 0 | Status: SHIPPED | OUTPATIENT
Start: 2021-01-22 | End: 2021-05-10

## 2021-01-22 NOTE — TELEPHONE ENCOUNTER
Last OV:12/28/2020  Last medication refills: Provider historical  Hydrocortisone 20 mg   Vitamin D   Vitamin C 500 mg  Protonix 40 mg

## 2021-01-22 NOTE — TELEPHONE ENCOUNTER
Got patient an appt with Dr. Tez Seals on Tuesday at 9 AM via virtual visit. Patient is aware. Gave enough steroids to get her through until this visit. Discussed she needs to speak with Pulm to see if this should be weaned but should not stop them abruptly given she has been on them for >1 month. Other meds filled as well.

## 2021-01-24 LAB
ECHO AO ASC DIAM: 2.83 CM
ECHO AO ROOT DIAM: 2.7 CM
ECHO AV AREA PEAK VELOCITY: 1.69 CM2
ECHO AV AREA VTI: 1.89 CM2
ECHO AV AREA/BSA PEAK VELOCITY: 0.9 CM2/M2
ECHO AV AREA/BSA VTI: 1 CM2/M2
ECHO AV MEAN GRADIENT: 7.24 MMHG
ECHO AV PEAK GRADIENT: 15.08 MMHG
ECHO AV PEAK VELOCITY: 194.15 CM/S
ECHO AV VTI: 41.32 CM
ECHO LA AREA 4C: 18.78 CM2
ECHO LA MAJOR AXIS: 3.99 CM
ECHO LA MINOR AXIS: 2.07 CM
ECHO LA VOL 2C: 68.24 ML (ref 22–52)
ECHO LA VOL 4C: 57.19 ML (ref 22–52)
ECHO LA VOL BP: 74.67 ML (ref 22–52)
ECHO LA VOL/BSA BIPLANE: 38.78 ML/M2 (ref 16–28)
ECHO LA VOLUME INDEX A2C: 35.44 ML/M2 (ref 16–28)
ECHO LA VOLUME INDEX A4C: 29.7 ML/M2 (ref 16–28)
ECHO LV E' SEPTAL VELOCITY: 4.55 CM/S
ECHO LV EDV A2C: 83.5 ML
ECHO LV EDV A4C: 62.84 ML
ECHO LV EDV BP: 72.94 ML (ref 56–104)
ECHO LV EDV INDEX A4C: 32.6 ML/M2
ECHO LV EDV INDEX BP: 37.9 ML/M2
ECHO LV EDV NDEX A2C: 43.4 ML/M2
ECHO LV EJECTION FRACTION A2C: 61 PERCENT
ECHO LV EJECTION FRACTION A4C: 52 PERCENT
ECHO LV EJECTION FRACTION BIPLANE: 55.9 PERCENT (ref 55–100)
ECHO LV ESV A2C: 32.48 ML
ECHO LV ESV A4C: 30.2 ML
ECHO LV ESV BP: 32.19 ML (ref 19–49)
ECHO LV ESV INDEX A2C: 16.9 ML/M2
ECHO LV ESV INDEX A4C: 15.7 ML/M2
ECHO LV ESV INDEX BP: 16.7 ML/M2
ECHO LV INTERNAL DIMENSION DIASTOLIC: 4.29 CM (ref 3.9–5.3)
ECHO LV INTERNAL DIMENSION SYSTOLIC: 3.04 CM
ECHO LV IVSD: 0.96 CM (ref 0.6–0.9)
ECHO LV MASS 2D: 120.1 G (ref 67–162)
ECHO LV MASS INDEX 2D: 62.4 G/M2 (ref 43–95)
ECHO LV POSTERIOR WALL DIASTOLIC: 0.81 CM (ref 0.6–0.9)
ECHO LVOT DIAM: 2 CM
ECHO LVOT PEAK GRADIENT: 4.41 MMHG
ECHO LVOT PEAK VELOCITY: 104.95 CM/S
ECHO LVOT SV: 78.2 ML
ECHO LVOT VTI: 24.98 CM
ECHO MV A VELOCITY: 84.99 CM/S
ECHO MV AREA PHT: 3.75 CM2
ECHO MV E DECELERATION TIME (DT): 202.45 MS
ECHO MV E VELOCITY: 96.63 CM/S
ECHO MV E/A RATIO: 1.14
ECHO MV E/E' SEPTAL: 21.24
ECHO MV PRESSURE HALF TIME (PHT): 58.71 MS
ECHO PV MAX VELOCITY: 147.7 CM/S
ECHO PV MEAN GRADIENT: 4.66 MMHG
ECHO PV PEAK INSTANTANEOUS GRADIENT SYSTOLIC: 8.73 MMHG
ECHO PV VTI: 29.68 CM
ECHO RA AREA 4C: 16.95 CM2
ECHO RV INTERNAL DIMENSION: 3.68 CM
ECHO RV TAPSE: 2.14 CM (ref 1.5–2)
ECHO TV REGURGITANT MAX VELOCITY: 217.77 CM/S
ECHO TV REGURGITANT PEAK GRADIENT: 18.97 MMHG
LA VOL DISK BP: 69.32 ML (ref 22–52)

## 2021-01-26 ENCOUNTER — TELEPHONE (OUTPATIENT)
Dept: PRIMARY CARE CLINIC | Age: 61
End: 2021-01-26

## 2021-01-26 NOTE — TELEPHONE ENCOUNTER
Pt had virtual today with Dr. Janet Zhao and she said that the xray did not show significant scarring to lungs and that the patient should be weened off of the hydrocortisone. Please call back.

## 2021-01-28 ENCOUNTER — VIRTUAL VISIT (OUTPATIENT)
Dept: CARDIOLOGY CLINIC | Age: 61
End: 2021-01-28
Payer: COMMERCIAL

## 2021-01-28 DIAGNOSIS — Z86.16 HISTORY OF COVID-19: ICD-10-CM

## 2021-01-28 DIAGNOSIS — G93.31 POSTVIRAL FATIGUE SYNDROME: ICD-10-CM

## 2021-01-28 DIAGNOSIS — R06.09 DOE (DYSPNEA ON EXERTION): Primary | ICD-10-CM

## 2021-01-28 DIAGNOSIS — J98.6 ELEVATED DIAPHRAGM: ICD-10-CM

## 2021-01-28 PROCEDURE — 99213 OFFICE O/P EST LOW 20 MIN: CPT | Performed by: SPECIALIST

## 2021-01-28 RX ORDER — HYDROCORTISONE 20 MG/1
20 TABLET ORAL 2 TIMES DAILY
Qty: 10 TAB | Refills: 0 | Status: CANCELLED | OUTPATIENT
Start: 2021-01-28

## 2021-01-28 NOTE — PROGRESS NOTES
Cecil Engel     1960       Maikel Carlin MD, [facility]  Date of Visit-1/28/2021   PCP is DO Remi Marvin [facility] [place]  [facility]  Virtual Visit  HPI:  Cecil Engel is a 63 y.o. female   who was seen by synchronous (real-time) audio-video technology on 1/28/2021. Fu of  Office visit January 7, 2021 patient referred from Dr. Archana Dumont with a history of Covid in October with a 2-month hospitalization at [facility] and she had persistent fatigue MORA she has a history of hypertension and dizziness and was using a walker. XR Results (most recent):  Results from Hospital Encounter encounter on 01/13/21   XR CHEST PA LAT    Narrative Exam:  2 view chest    Indication: Shortness of breath, dyspnea on exertion, history of Covid in  October. COMPARISON: None    PA and lateral views demonstrate normal heart size. The left hemidiaphragm is  elevated. There are parenchymal opacities above the elevated diaphragm  consistent atelectasis. This suggest paralysis of left hemidiaphragm. Right lung is clear. No adenopathy or pleural effusions. There is mild dextroconvex curvature of the thoracic spine. Impression IMPRESSION:  1. Elevated left hemidiaphragm with mild atelectasis above the diaphragm. This  suggest the possibility of paralysis. Diaphragm fluoroscopy may yield more  information      Today  Little better seems actually moderately better still however limited  Still little limtied just want when trying to [place]  Echo was normal  CXR   Labs  SOB when talks for awhile  Previous hx or visit:    01/15/21   ECHO ADULT COMPLETE 01/24/2021 1/24/2021    Narrative · LV: Estimated LVEF is 65 - 70%. Biplane method used to measure ejection   fraction. Normal cavity size, wall thickness and systolic function   (ejection fraction normal). Wall motion: normal. Left ventricular   diastolic dysfunction. · LA: Mildly dilated left atrium.  Left Atrium volume index is 38 mL/m2. · PA: Pulmonary arterial systolic pressure is 25 mmHg. Signed by: Leander Scott MD          No specialty comments available. Assessment/Plan:    1. MORA (dyspnea on exertion)    2. Elevated diaphragm    3. History of COVID-19    4. Postviral fatigue syndrome        Patient has suffered with fatigue and MORA since she has had Covid. We did an echocardiogram which is fairly normal and no evidence of pulmonary hypertension. I am not finding a cardiac abnormality we can certainly could consider an MRI but I like to see first her pulmonary evaluation. She has a [facility] appointment coming up soon. We got a chest x-ray since she was short of breath and incidentally found an elevated left hemidiaphragm    Dr Mcdonough Loop appt pending  She is clear heart wise  iwll have pulm fu on the diaphragm issue   See us back PRN  Future Appointments   Date Time Provider Carrie Celia   2/26/2021  3:00 PM Sundar Lujan, DO [facility]      This note was created using voice recognition software. Despite editing, there may be syntax errors. Key CAD CHF Meds     Patient is on no cardiovascular meds. ROS-except as noted above. . A complete cardiac and respiratory are reviewed and negative except as above ; Resp-denies wheezing  or productive cough,. Const- No unusual weight loss or fever; Neuro-no recent seizure or CVA ; GI- No BRBPR, abdom pain, bloating ; - no  hematuria   Past Medical History:   Diagnosis Date    Environmental and seasonal allergies       Social Hx= reports that she has never smoked. She has never used smokeless tobacco. She reports current alcohol use. She reports that she does not use drugs. Due to this being a TeleHealth evaluation, many elements of the physical examination are unable to be assessed. Vitals if sent, or see HPI  There were no vitals taken for this visit.    General: Well developed, in no acute distress, cooperative and alert  HEENT: Pupils equal/round. No marked JVD visible on video. Respiratory: No audible wheezing, no signs of respiratory distress, lips non cyanotic  Extremities:  No edema  Neuro: A&Ox3, speech clear, no facial droop, answering questions appropriately  Skin: Skin color is normal. No rashes or lesions. Non diaphoretic on visible skin during exam  Psych: mood and affect are appropriate and pleasant    Lab Results   Component Value Date/Time    Cholesterol, total 217 (H) 01/13/2017 09:59 AM    HDL Cholesterol 70 01/13/2017 09:59 AM    LDL, calculated 131 (H) 01/13/2017 09:59 AM    Triglyceride 78 01/13/2017 09:59 AM     Lab Results   Component Value Date/Time    Sodium 138 01/07/2021 04:53 PM    Potassium 4.2 01/07/2021 04:53 PM    Chloride 108 01/07/2021 04:53 PM    CO2 29 01/07/2021 04:53 PM    Anion gap 1 (L) 01/07/2021 04:53 PM    Glucose 91 01/07/2021 04:53 PM    BUN 10 01/07/2021 04:53 PM    Creatinine 1.04 (H) 01/07/2021 04:53 PM    BUN/Creatinine ratio 10 (L) 01/07/2021 04:53 PM    GFR est AA >60 01/07/2021 04:53 PM    GFR est non-AA 54 (L) 01/07/2021 04:53 PM    Calcium 9.3 01/07/2021 04:53 PM      Wt Readings from Last 3 Encounters:   01/15/21 198 lb (89.8 kg)   01/07/21 198 lb (89.8 kg)   06/01/17 164 lb 9.6 oz (74.7 kg)      BP Readings from Last 3 Encounters:   01/15/21 (!) 134/90   01/07/21 (!) 134/90   06/01/17 118/68        Current Outpatient Medications   Medication Sig    hydrocortisone (CORTEF) 20 mg tablet Take 1 Tab by mouth two (2) times a day.  ascorbic acid, vitamin C, (Vitamin C) 500 mg tablet Take 1 Tab by mouth three (3) times daily.  cholecalciferol (Vitamin D3) (1000 Units /25 mcg) tablet Take 2 Tabs by mouth daily.  pantoprazole (PROTONIX) 40 mg tablet Take 1 Tab by mouth daily.  ZINC PO Take 1 Tab by mouth daily.  zinc sulfate 220 mg tablet Take 1 Tab by mouth daily.     ondansetron (ZOFRAN ODT) 4 mg disintegrating tablet Take 1 Tab by mouth every eight (8) hours as needed for Nausea or Vomiting.  DIVIGEL 1 mg (0.1 %) glpk USE 1 PACKET AS DIRECTED    PREMARIN 0.625 mg/gram vaginal cream INSERT 0.5GRAM VAGINALLY TWICE WEEKLY     No current facility-administered medications for this visit. Impression see above. VIRTUAL VISIT DOCUMENTATION   Pursuant to the emergency declaration under the [address] [place] [address] wa[facility] authority and the [employer] Act, this Virtual  Visit was conducted, with patient's consent, to reduce the patient's risk of exposure to COVID-19 and provide continuity of care for an established patient. Services were provided through a video synchronous discussion virtually to substitute for in-person clinic visit. We discussed the expected course, resolution and complications of the diagnosis(es) in detail. Medication risks, benefits, costs, interactions, and alternatives were discussed as indicated. I advised her to contact the office if her condition worsens, changes or fails to improve as anticipated. She expressed understanding with the diagnosis(es) and plan  I have reviewed the nurses notes, vitals, problem list, allergy list, medical history, family, social history and medications. FOLLOW-UP   Patient was made aware and verbalized understanding that an appointment will be scheduled for them for a virtual visit and/or office visit within the above time frame. Patient understanding his/her responsibility to call and change time/date if he/she so chooses. Ashu Mendez MD    [facility].  [facility], [address]  [phone] / [phone] Fax  [phone] / [phone] Fax  This visit was conducted using [employer] services or similar service.

## 2021-01-28 NOTE — PROGRESS NOTES
Discussed at follow up visit 1/28/2021       Echo is essentially normal  Your chest xray showed some atelectasis and possible slow movement of the left diaphragm muscle which moves the lung up and down, you may need a fluoroscopy xray but first see what the Pulmonologist Dr Nabil Garcia thinks is best.  It was great talking to you today and your labs and echo are all great. No future appointments.

## 2021-01-28 NOTE — TELEPHONE ENCOUNTER
Called patient. Need to see if Pulm wants to wean her off the steroids as this was for her lung issues. Left VM to call back.

## 2021-01-29 RX ORDER — HYDROCORTISONE 10 MG/1
TABLET ORAL
Qty: 18 TAB | Refills: 0 | Status: SHIPPED | OUTPATIENT
Start: 2021-01-29 | End: 2021-05-10

## 2021-01-29 NOTE — TELEPHONE ENCOUNTER
Received Pulm note. Called Dr. Caraballo Harness office to see if they have further reccs on how to wean her as we do not have [facility] records or know how long she has been on steroids. Nurse states she will call Dr. Lula Arreaga for reccs on wean.

## 2021-01-29 NOTE — TELEPHONE ENCOUNTER
Spoke with Pulm. PA recommended 30mg wean over weekend and reassess on Monday. Will do 20mg in AM and 10mg in evening. Instructed to take 2 tabs tonight since she missed her dose this AM. She understands plan. Instructed to call after hours line if she experiences any problems. Will follow up on Monday.

## 2021-02-02 ENCOUNTER — TELEPHONE (OUTPATIENT)
Dept: PRIMARY CARE CLINIC | Age: 61
End: 2021-02-02

## 2021-02-02 NOTE — TELEPHONE ENCOUNTER
Called patient to see how she did over the weekend. States Dr. Ambrose Poag office gave her a taper for the steroids. Made appt for 2/26/21 at 3 PM. Instructed to call if she is having any issues before then.

## 2021-02-26 ENCOUNTER — OFFICE VISIT (OUTPATIENT)
Dept: PRIMARY CARE CLINIC | Age: 61
End: 2021-02-26
Payer: COMMERCIAL

## 2021-02-26 VITALS
BODY MASS INDEX: 36.14 KG/M2 | DIASTOLIC BLOOD PRESSURE: 80 MMHG | OXYGEN SATURATION: 100 % | SYSTOLIC BLOOD PRESSURE: 142 MMHG | TEMPERATURE: 98.1 F | WEIGHT: 204 LBS | HEART RATE: 80 BPM | HEIGHT: 63 IN | RESPIRATION RATE: 16 BRPM

## 2021-02-26 DIAGNOSIS — R63.5 WEIGHT GAIN: ICD-10-CM

## 2021-02-26 DIAGNOSIS — R53.81 PHYSICAL DECONDITIONING: Primary | ICD-10-CM

## 2021-02-26 DIAGNOSIS — Z86.16 HISTORY OF COVID-19: ICD-10-CM

## 2021-02-26 DIAGNOSIS — Z79.52 LONG TERM CURRENT USE OF SYSTEMIC STEROIDS: ICD-10-CM

## 2021-02-26 DIAGNOSIS — I10 ESSENTIAL HYPERTENSION: ICD-10-CM

## 2021-02-26 PROCEDURE — 99214 OFFICE O/P EST MOD 30 MIN: CPT | Performed by: FAMILY MEDICINE

## 2021-02-26 RX ORDER — MONTELUKAST SODIUM 10 MG/1
TABLET ORAL
COMMUNITY
Start: 2021-01-26 | End: 2022-04-08 | Stop reason: SDUPTHER

## 2021-02-26 NOTE — PROGRESS NOTES
Room 7    Identified pt with two pt identifiers(name and ). Reviewed record in preparation for visit and have obtained necessary documentation. All patient medications has been reviewed. Chief Complaint   Patient presents with    Concern For [id number] (Coronavirus)     Follow up        3 most recent [facility] Screens 2021   Little interest or pleasure in doing things Not at all   Feeling down, depressed, irritable, or hopeless Not at all   Total Score PHQ 2 0     Abuse Screening Questionnaire 2017   Do you ever feel afraid of your partner? N   Are you in a relationship with someone who physically or mentally threatens you? N   Is it safe for you to go home? Y       Health Maintenance Due   Topic    COVID-19 Vaccine (1 of 2)    Shingrix Vaccine Age 50> (1 of 2)    Colorectal Cancer Screening Combo     A1C test (Diabetic or Prediabetic)     PAP AKA CERVICAL CYTOLOGY     Flu Vaccine (1)     Health Maintenance Review: Patient reminded of \"due or due soon\" health maintenance. I have asked the patient to contact his/her primary care provider (PCP) for follow-up on his/her health maintenance. There were no vitals filed for this visit. Wt Readings from Last 3 Encounters:   01/15/21 198 lb (89.8 kg)   21 198 lb (89.8 kg)   17 164 lb 9.6 oz (74.7 kg)     Temp Readings from Last 3 Encounters:   17 97.8 °F (36.6 °C) (Oral)   17 98.4 °F (36.9 °C) (Oral)   16 98.1 °F (36.7 °C) (Oral)     BP Readings from Last 3 Encounters:   01/15/21 (!) 134/90   21 (!) 134/90   17 118/68     Pulse Readings from Last 3 Encounters:   21 76   17 60   17 85       Coordination of Care Questionnaire:   1) Have you been to an emergency room, urgent care, or hospitalized since your last visit? No     2. Have seen or consulted any other health care provider since your last visit?   No

## 2021-02-26 NOTE — PROGRESS NOTES
HPI     Chief Complaint   Patient presents with    Concern For COVID-19 (Coronavirus)     Follow up      She is a 63 y.o. female who presents for [employer]. Tapering the steroids still. Will finish the taper 3/5. Still doing physical therapy at home. States she is working with [facility] at home and they are working well with her. Was discharged from OT. No issues swallowing or eating. Saw Cardiology and they said everything was okay. Has appt with Pulm Dr. Delano Armas 3/19 for walking oximeter test and PFTs. Has appt with OB/ GYN next week. Does not have an appt for a COVID vaccine. Does not think she got monoclonal antibodies or plasma while hospitalized. Was discharged 12/22. Still have not received records. Review of Systems   Constitutional: Negative for chills and fever. Respiratory: Positive for shortness of breath. Negative for cough. Cardiovascular: Negative for chest pain. Reviewed PmHx, RxHx, FmHx, SocHx, AllgHx and updated and dated in the chart. Physical Exam:  Visit Vitals  BP (!) 142/80 (BP 1 Location: Left arm, BP Patient Position: Sitting)   Pulse 80   Temp 98.1 °F (36.7 °C)   Resp 16   Ht 5' 3\" (1.6 m)   Wt 204 lb (92.5 kg)   SpO2 100%   BMI 36.14 kg/m²     Physical Exam  Vitals signs and nursing note reviewed. Constitutional:       General: She is not in acute distress. Appearance: Normal appearance. She is not ill-appearing. Comments: Patient is easily fatigued and very deconditioned. Using walker. Cardiovascular:      Rate and Rhythm: Normal rate and regular rhythm. Heart sounds: No murmur. Pulmonary:      Effort: Pulmonary effort is normal. No respiratory distress. Breath sounds: Normal breath sounds. Comments: Gets out of breath easily talking for a few minutes. This is her current baseline although improving since hospital discharge in December. Neurological:      General: No focal deficit present.       Mental Status: She is alert.   Psychiatric:         Mood and Affect: Mood normal.         Behavior: Behavior normal.       No results found for this or any previous visit (from the past 12 hour(s)). Assessment / Plan     Diagnoses and all orders for this visit:    1. Physical deconditioning    2. Weight gain  -     CBC W/O DIFF; Future  -     METABOLIC PANEL, COMPREHENSIVE; Future  -     HEMOGLOBIN A1C WITH EAG; Future    3. Long term current use of systemic steroids  -     CBC W/O DIFF; Future  -     METABOLIC PANEL, COMPREHENSIVE; Future  -     HEMOGLOBIN A1C WITH EAG; Future    4. History of COVID-19    5. Essential hypertension       Had lengthy discussion. She is extremely deconditioned and was out of breath walking down the hallway to get to the exam room with a walker. She is followed by [facility] and [facility]. She is set to return to work 3/1. 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 Discussed I think it would be best to extend her time out through the end of March and reassess before April. She did inquire about outpatient PT and I am worried that just getting to and from these appointments would exhaust her to the point she would not be able to fully participate at the PT appt. Her  agreed with this. Discussed we could continue at home PT for the time being and reassess this in a few weeks as well if she likes which she agreed with. We discussed the importance of being careful, continuing social distancing as we do not know how long her immunity will last. Would like to get her records as they are unsure if she got monoclonal antibodies or plasma and this would change when she should get the vaccine. Will follow up pending labs. I have discussed the diagnosis with the patient and the intended plan as seen in the above orders.  The patient has received an after-visit summary and questions were answered concerning future plans. I have discussed medication side effects and warnings with the patient as well.     Time-based coding, delete if not needed: I spent at least 25 minutes with this established patient, and >50% of the time was spent counseling and/or coordinating care regarding deconditioning, weight gain, steroid use, fatigue  Total Time: minutes: [address] [place] [address],

## 2021-02-26 NOTE — PATIENT INSTRUCTIONS
Fatigue: Care Instructions Your Care Instructions Fatigue is a feeling of tiredness, exhaustion, or lack of energy. You may feel fatigue because of too much or not enough activity. It can also come from stress, lack of sleep, boredom, and poor diet. Many medical problems, such as viral infections, can cause fatigue. Emotional problems, especially depression, are often the cause of fatigue. Fatigue is most often a symptom of another problem. Treatment for fatigue depends on the cause. For example, if you have fatigue because you have a certain health problem, treating this problem also treats your fatigue. If depression or anxiety is the cause, treatment may help. Follow-up care is a key part of your treatment and safety. Be sure to make and go to all appointments, and call your doctor if you are having problems. It's also a good idea to know your test results and keep a list of the medicines you take. How can you care for yourself at home? · Get regular exercise. But don't overdo it. Go back and forth between rest and exercise. · Get plenty of rest. 
· Eat a healthy diet. Do not skip meals, especially breakfast. 
· Reduce your use of caffeine, tobacco, and alcohol. Caffeine is most often found in coffee, tea, cola drinks, and chocolate. · Limit medicines that can cause fatigue. This includes tranquilizers and cold and allergy medicines. When should you call for help? Watch closely for changes in your health, and be sure to contact your doctor if: 
  · You have new symptoms such as fever or a rash.  
  · Your fatigue gets worse.  
  · You have been feeling down, depressed, or hopeless. Or you may have lost interest in things that you usually enjoy.  
  · You are not getting better as expected. Where can you learn more? Go to http://www.gray.com/ Enter U511 in the search box to learn more about \"Fatigue: Care Instructions. \" 
 Current as of: June 26, 2019               Content Version: 12.6 © 8321-6705 [employer]. Care instructions adapted under license by [employer] (which disclaims liability or warranty for this information).  If you have questions about a medical condition or this instruction, always ask your healthcare professional. [address] any warranty or liability for your use of this information.

## 2021-03-03 ENCOUNTER — TELEPHONE (OUTPATIENT)
Dept: PRIMARY CARE CLINIC | Age: 61
End: 2021-03-03

## 2021-03-03 DIAGNOSIS — R06.02 CHRONIC SHORTNESS OF BREATH: Primary | ICD-10-CM

## 2021-03-03 DIAGNOSIS — Z86.16 HISTORY OF COVID-19: ICD-10-CM

## 2021-03-18 ENCOUNTER — TRANSCRIBE ORDER (OUTPATIENT)
Dept: SCHEDULING | Age: 61
End: 2021-03-18

## 2021-03-18 DIAGNOSIS — Z12.31 VISIT FOR SCREENING MAMMOGRAM: Primary | ICD-10-CM

## 2021-03-19 ENCOUNTER — HOSPITAL ENCOUNTER (OUTPATIENT)
Dept: MAMMOGRAPHY | Age: 61
Discharge: HOME | End: 2021-03-19
Attending: FAMILY MEDICINE
Payer: COMMERCIAL

## 2021-03-19 DIAGNOSIS — Z12.31 VISIT FOR SCREENING MAMMOGRAM: ICD-10-CM

## 2021-03-19 PROCEDURE — 77063 BREAST TOMOSYNTHESIS BI: CPT

## 2021-03-30 ENCOUNTER — VIRTUAL VISIT (OUTPATIENT)
Dept: PRIMARY CARE CLINIC | Age: 61
End: 2021-03-30
Payer: COMMERCIAL

## 2021-03-30 DIAGNOSIS — R25.1 TREMULOUSNESS: Primary | ICD-10-CM

## 2021-03-30 DIAGNOSIS — G93.31 POSTVIRAL FATIGUE SYNDROME: ICD-10-CM

## 2021-03-30 PROCEDURE — 99214 OFFICE O/P EST MOD 30 MIN: CPT | Performed by: FAMILY MEDICINE

## 2021-03-30 NOTE — PROGRESS NOTES
Kortney Carranza  63 y.o. female  1960  [address] [facility] [address]  [id number]   [facility]   Telemedicine Progress Note  Hallie Garcia DO       Encounter Date and Time: March 30, 2021 at 2:45 PM    Consent: Kortney Carranza, who was seen by synchronous (real-time) audio-video technology, and/or her healthcare decision maker, is aware that this patient-initiated, Telehealth encounter on 3/30/2021 is a billable service, with coverage as determined by her insurance carrier. She is aware that she may receive a bill and has provided verbal consent to proceed: NA - Consent obtained within past 12 months. No chief complaint on file. History of Present Illness   Kortney Carranza is a 63 y.o. female was evaluated by synchronous (real-time) audio-video technology from home, through a secure patient portal [employer]. me. Making improvement with [facility]. Doing 3 steps at a time at home she states. Working on walking longer distances. Needs extension to short term disability until 4/22. Needs a handicap placard. States she will have to go back to work 4/23 or it will affect her custodial. States that her Pulmonologist suggested she see Neuro. This was also mentioned to her in the hospital when she was discharged. States she has been tremulous. Her  notes this has been getting better but Pulm did suggest it. She is set to receive her first COVID vaccine tomorrow. She has been out of the hospital >90 days. Review of Systems   Review of Systems   Constitutional: Positive for malaise/fatigue. Respiratory: Positive for shortness of breath. Negative for cough.       Vitals/Objective:     General: alert, cooperative, no distress   Mental  status: mental status: alert, oriented to person, place, and time, normal mood, behavior, speech, dress, motor activity, and thought processes   Resp: resp: normal effort and no respiratory distress   Neuro: neuro: no gross deficits   Skin: skin: no discoloration or lesions of concern on visible areas   Due to this being a TeleHealth evaluation, many elements of the physical examination are unable to be assessed. Assessment and Plan:   Time-based coding, delete if not needed: I spent at least 25 minutes with this established patient, and >50% of the time was spent counseling and/or coordinating care regarding postviral fatigue syndrome, tremulousness    1. Tremulousness  - REFERRAL TO NEUROLOGY    2. Postviral fatigue syndrome  She is unsure if she received monoclonal antibodies or plasma in hospital but has been >90 days. Per CDC need to wait 90 days after receiving these treatments before getting a vaccine. Will fill out short term disability paperwork and handicap placard. Will try to touch base with PT to see what limitations are before offering return to work recommendations. She is still easily fatigued talking and I would recommend she work from home it at all possible. Time spent in direct conversation with the patient to include medical condition(s) discussed, assessment and treatment plan: 30 min    We discussed the expected course, resolution and complications of the diagnosis(es) in detail. Medication risks, benefits, costs, interactions, and alternatives were discussed as indicated. I advised her to contact the office if her condition worsens, changes or fails to improve as anticipated. She expressed understanding with the diagnosis(es) and plan. Patient understands that this encounter was a temporary measure, and the importance of further follow up and examination was emphasized. Patient verbalized understanding. Patient informed to follow up: in 3 weeks. Electronically Signed: Salome Felix DO    Tamiko Hassan is a 63 y.o. female who was evaluated by an audio-video encounter for concerns as above. Patient identification was verified prior to start of the visit.  A caregiver was present when appropriate. Due to this being a TeleHealth encounter (During QSDRF-22 public health emergency), evaluation of the following organ systems was limited: Vitals/Constitutional/EENT/Resp/CV/GI//MS/Neuro/Skin/Heme-Lymph-Imm. Pursuant to the emergency declaration under the [address] [place] [address] waiver authority and the [employer] Act, this Virtual Visit was conducted, with patient's (and/or legal guardian's) consent, to reduce the patient's risk of exposure to COVID-19 and provide necessary medical care. Services were provided through a synchronous discussion virtually to substitute for in-person clinic visit. I was in the office. The patient was at home. History   Patients past medical, surgical and family histories were reviewed and updated.       Past Medical History:   Diagnosis Date    Environmental and seasonal allergies      Past Surgical History:   Procedure Laterality Date    HX GYN      hysterectomy      Family History   Problem Relation Age of Onset    Hypertension Mother     Diabetes Mother      Social History     Socioeconomic History    Marital status:      Spouse name: Not on file    Number of children: Not on file    Years of education: Not on file    Highest education level: Not on file   Occupational History    Not on file   Social Needs    Financial resource strain: Not on file    Food insecurity     Worry: Not on file     Inability: Not on file    Transportation needs     Medical: Not on file     Non-medical: Not on file   Tobacco Use    Smoking status: Never Smoker    Smokeless tobacco: Never Used   Substance and Sexual Activity    Alcohol use: Yes     Frequency: Monthly or less     Drinks per session: 1 or 2     Comment: a few drinks a month    Drug use: Never    Sexual activity: Not on file   Lifestyle    Physical activity     Days per week: Not on file     Minutes per session: Not on file    Stress: Not on file   Relationships    Social connections     Talks on phone: Not on file     Gets together: Not on file     Attends Religion service: Not on file     Active member of club or organization: Not on file     Attends meetings of clubs or organizations: Not on file     Relationship status: Not on file    Intimate partner violence     Fear of current or ex partner: Not on file     Emotionally abused: Not on file     Physically abused: Not on file     Forced sexual activity: Not on file   Other Topics Concern    Not on file   Social History Narrative    Not on file     Patient Active Problem List   Diagnosis Code    Prediabetes R73.03    Mixed hyperlipidemia E78.2    History of COVID-19 Z86.16    Postviral fatigue syndrome G93.3    MORA (dyspnea on exertion) U90.71    Systolic murmur X81.8          Current Medications/Allergies   Medications and Allergies reviewed:    Current Outpatient Medications   Medication Sig Dispense Refill    montelukast (SINGULAIR) 10 mg tablet TAKE 1 TABLET BY MOUTH EVERY DAY      hydrocortisone (CORTEF) 10 mg tablet Take 2 tabs PO in AM and 1 tab PO every evening 18 Tab 0    ascorbic acid, vitamin C, (Vitamin C) 500 mg tablet Take 1 Tab by mouth three (3) times daily. 30 Tab 0    cholecalciferol (Vitamin D3) (1000 Units /25 mcg) tablet Take 2 Tabs by mouth daily. 60 Tab 0    pantoprazole (PROTONIX) 40 mg tablet Take 1 Tab by mouth daily. 30 Tab 0    ZINC PO Take 1 Tab by mouth daily.  zinc sulfate 220 mg tablet Take 1 Tab by mouth daily.  ondansetron (ZOFRAN ODT) 4 mg disintegrating tablet Take 1 Tab by mouth every eight (8) hours as needed for Nausea or Vomiting.  10 Tab 0    DIVIGEL 1 mg (0.1 %) glpk USE 1 PACKET AS DIRECTED  11    PREMARIN 0.625 mg/gram vaginal cream INSERT 0.5GRAM VAGINALLY TWICE WEEKLY  1     Allergies   Allergen Reactions    Asa-Acetaminophen-Caff-Buffers Nausea and Vomiting    Aspirin Nausea and Vomiting    Pcn [Penicillins] Hives

## 2021-04-08 ENCOUNTER — TELEPHONE (OUTPATIENT)
Dept: PRIMARY CARE CLINIC | Age: 61
End: 2021-04-08

## 2021-04-08 NOTE — TELEPHONE ENCOUNTER
----- Message from [address] [place] [address] sent at 4/5/2021  2:06 PM EDT -----  Regarding: DO JOSIANE Carvalho  General Message/Vendor Calls    Caller's first and last name: Daniele Malcolm       Reason for call:   Request for handicap       Callback required yes/no and why:  yes to confirm      Best contact number(s):  [phone]      Details to clarify the request:   patient stated forms were left a the office for completionFriday and wanted to know if she can pick them up.       Cristel Lopez

## 2021-04-15 ENCOUNTER — TELEPHONE (OUTPATIENT)
Dept: PRIMARY CARE CLINIC | Age: 61
End: 2021-04-15

## 2021-04-15 NOTE — TELEPHONE ENCOUNTER
Called patient and for the number to her HR department and called and left message to call office back

## 2021-04-15 NOTE — TELEPHONE ENCOUNTER
----- Message from [facility] sent at 4/13/2021 11:18 AM EDT -----  Regarding: Dr. Amilcra Ribera  Patient return call    Caller's first and last name and relationship (if not the patient): Pt      Best contact number(s): [phone]      Whose call is being returned: office      Details to clarify the request: Regarding disability paperwork      [facility]

## 2021-04-15 NOTE — TELEPHONE ENCOUNTER
Patient said that she is filling out long term disability because per policy she can only go back with no restrictions. Patient said she left paperwork at  - will go look for it.

## 2021-05-10 ENCOUNTER — OFFICE VISIT (OUTPATIENT)
Dept: NEUROLOGY | Age: 61
End: 2021-05-10
Payer: COMMERCIAL

## 2021-05-10 VITALS
OXYGEN SATURATION: 98 % | RESPIRATION RATE: 18 BRPM | DIASTOLIC BLOOD PRESSURE: 68 MMHG | HEART RATE: 81 BPM | SYSTOLIC BLOOD PRESSURE: 116 MMHG

## 2021-05-10 DIAGNOSIS — U09.9 POST-COVID-19 CONDITION: ICD-10-CM

## 2021-05-10 DIAGNOSIS — R27.8 TRUNCAL ATAXIA: Primary | ICD-10-CM

## 2021-05-10 DIAGNOSIS — R26.81 GAIT INSTABILITY: ICD-10-CM

## 2021-05-10 PROCEDURE — 99245 OFF/OP CONSLTJ NEW/EST HI 55: CPT | Performed by: PSYCHIATRY & NEUROLOGY

## 2021-05-10 RX ORDER — BISMUTH SUBSALICYLATE 262 MG
1 TABLET,CHEWABLE ORAL DAILY
COMMUNITY

## 2021-05-10 NOTE — PROGRESS NOTES
NEUROLOGY  NEW PATIENT EVALUATION/CONSULTATION       PATIENT NAME: Jacy Smith    MRN: [id number]    REASON FOR CONSULTATION: Atypical movement when sitting    05/10/21      Previous records (physician notes, laboratory reports, and radiology reports) and imaging studies were reviewed and summarized. My recommendations will be communicated back to the patient's physician(s) via electronic medical record and/or by [address] mail. HISTORY OF PRESENT ILLNESS:  Jacy Smith is a 63 y.o. right handed female presenting for evaluation of atypical movement when sitting. Patient reports she was diagnosed with COVID this past October with hospitalization until December 2020. She had some difficulty with hypoxia and continues with dyspnea with activity or prolonged speech followed by Pulmonology. She is referred due to \"rocking while seated\" which is primarily noted by her . The patient is not particularly bothered by rocking/swaying. No provoking/relieving factors. She denies recent falls but is requiring the use of a walker due to gait instability post hospitalization. She has noted some intermittent \"locking up\" of her distal upper extremities with activity. She endorses muscle spasms/cramping occurring a few times monthly lasting a few minutes on average. Denies extremity paresthesias or cervicalgia. She reports a h/o L foot drop during her hospitalization for COVID-wears AFO for this. No h/o movement disorders/tremors. She has retired as of 5/1/21 due to current symptoms.      PAST MEDICAL HISTORY:  Past Medical History:   Diagnosis Date    COVID-19     Environmental and seasonal allergies        PAST SURGICAL HISTORY:  Past Surgical History:   Procedure Laterality Date    HX GYN      hysterectomy        FAMILY HISTORY:   Family History   Problem Relation Age of Onset    Hypertension Mother     Diabetes Mother          SOCIAL HISTORY:  Social History     Socioeconomic History    Marital status:      Spouse name: Not on file    Number of children: Not on file    Years of education: Not on file    Highest education level: Not on file   Tobacco Use    Smoking status: Never Smoker    Smokeless tobacco: Never Used   Substance and Sexual Activity    Alcohol use: Yes     Frequency: Monthly or less     Drinks per session: 1 or 2     Comment: a few drinks a month    Drug use: Never         MEDICATIONS:   Current Outpatient Medications   Medication Sig Dispense Refill    multivitamin (ONE A DAY) tablet Take 1 Tab by mouth daily.  cholecalciferol (Vitamin D3) (1000 Units /25 mcg) tablet Take 2 Tabs by mouth daily. 60 Tab 0    ondansetron (ZOFRAN ODT) 4 mg disintegrating tablet Take 1 Tab by mouth every eight (8) hours as needed for Nausea or Vomiting. 10 Tab 0    DIVIGEL 1 mg (0.1 %) glpk USE 1 PACKET AS DIRECTED  11    PREMARIN 0.625 mg/gram vaginal cream INSERT 0.5GRAM VAGINALLY TWICE WEEKLY  1    montelukast (SINGULAIR) 10 mg tablet TAKE 1 TABLET BY MOUTH EVERY DAY           ALLERGIES:  Allergies   Allergen Reactions    Asa-Acetaminophen-Caff-Buffers Nausea and Vomiting    Aspirin Nausea and Vomiting    Pcn [Penicillins] Hives         REVIEW OF SYSTEMS:  10 point ROS reviewed with patient. Please see scanned document under media. PHYSICAL EXAM:  Vital Signs:   Visit Vitals  /68   Pulse 81   Resp 18   SpO2 98%        General Medical Exam:  General:  Well appearing, comfortable, in no apparent distress. Eyes/ENT: see cranial nerve examination. Neck: No masses appreciated. Full range of motion without tenderness. Respiratory:  Clear to auscultation, good air entry bilaterally. Cardiac:  Regular rate and rhythm, no murmur. GI:  Soft, non-tender, non-distended abdomen. Bowel sounds normal. No masses, organomegaly. Extremities:  No deformities, edema, or skin discoloration. Skin:  No rashes or lesions.     Neurological:  · Mental Status:  Alert and oriented to person, place, and time. Speech is hesitant at times with dyspnea. · Cranial Nerves:   CNII/III/IV/VI: visual fields full to confrontation, EOMI, PERRL, no ptosis or nystagmus. CN V: Facial sensation intact bilaterally, masseter 5/5   CN VII: Facial muscles symmetric and strong   CN VIII: Hears finger rub well bilaterally, intact vestibular function   CN IX/X: Normal palatal movement   CN XI: Full strength shoulder shrug bilaterally   CN XII: Tongue protrusion full and midline without fasciculation or atrophy  · Motor: Normal tone and muscle bulk with no pronator drift. 5/5 with variable effort. · MSRs: No crossed adductors or clonus. RIGHT  LEFT   Brachioradialis 2+ 2+   Biceps 2+ 2+   Triceps 2+ 2+   Knee 2+ 2+   Achilles 2+ 2+        Plantar response Downward Downward          · Sensation: Normal and symmetric perception of pinprick, temperature, light touch, proprioception, and vibration; (-) Romberg. · Coordination: +Dysmetria b/l. Truncal ataxia-intermittent. Normal rapid alternating movements. No tremor/rigidity/bradykinesia. · Gait: Atypical gait, ataxic appearing, using a walker to assist    Component      Latest Ref Rng & Units 3/1/2021 3/1/2021 3/1/2021 1/7/2021           8:35 AM  8:35 AM  8:35 AM  4:53 PM   Sodium      136 - 145 mmol/L  140     Potassium      3.5 - 5.1 mmol/L  3.9     Chloride      97 - 108 mmol/L  107     CO2      21 - 32 mmol/L  25     Anion gap      5 - 15 mmol/L  8     Glucose      65 - 100 mg/dL  95     BUN      6 - 20 MG/DL  12     Creatinine      0.55 - 1.02 MG/DL  0.92     BUN/Creatinine ratio      12 - 20    13     GFR est AA      >60 ml/min/1.73m2  >60     GFR est non-AA      >60 ml/min/1.73m2  >60     Calcium      8.5 - 10.1 MG/DL  9.4     Bilirubin, total      0.2 - 1.0 MG/DL  0.5     ALT      12 - 78 U/L  35     AST      15 - 37 U/L  25     Alk.  phosphatase      45 - 117 U/L  61     Protein, total      6.4 - 8.2 g/dL  6.9     Albumin      3.5 - 5.0 g/dL  3.7 Globulin      2.0 - 4.0 g/dL  3.2     A-G Ratio      1.1 - 2.2    1.2     WBC      3.6 - 11.0 K/uL   3.5 (L)    RBC      3.80 - 5.20 M/uL   4.85    HGB      11.5 - 16.0 g/dL   12.8    HCT      35.0 - 47.0 %   41.8    MCV      80.0 - 99.0 FL   86.2    MCH      26.0 - 34.0 PG   26.4    MCHC      30.0 - 36.5 g/dL   30.6    RDW      11.5 - 14.5 %   13.7    PLATELET      882 - 184 K/uL   262    MPV      8.9 - 12.9 FL   9.5    NRBC      0  WBC   0.0    ABSOLUTE NRBC      0.00 - 0.01 K/uL   0.00    Hemoglobin A1c, (calculated)      4.0 - 5.6 % 5.7 (H)      Est. average glucose      mg/dL 117      TSH      0.36 - 3.74 uIU/mL    0.52     ASSESSMENT:      ICD-10-CM ICD-9-CM    1. 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 Gait instability  R26.81 781.2 MRI BRAIN WO CONT   63year old pleasant AAF s/p COVID infection with prolonged hospitalization 10/2020-12/2020 with subsequent dyspnea followed by Pulmonology and gait instability/ataxia requiring a walker for assistance. She also mentions intermittent \"locking\" of both hands with activity. She has retired recently due to above symptoms. She appears to have truncal ataxia with dysmetria and significant gait instability on examination. There does appear to be a possible slight non-physiologic component with some features on examination. We will obtain MRI Brain to exclude posterior circulation ischemia or post-infectious cerebellitis contributing to above presentation. In addition, will proceed with laboratory investigations in search of contributing metabolic derangements as well as paraneoplastic autoantibodies which may present similarly. Advise continued PT to address gait instability and prevent falls.      PLAN:  · B12, B1, Copper, Vit E, Cerebellar degeneration paraneoplastic autoantibody profile + [facility]  · MRI Brain WO  · Continue home PT as previously prescribed     Follow-up and Dispositions    · Return in about 6 weeks (around 6/21/2021). I have discussed the diagnosis with the patient today and the intended plan as seen in the above orders with both the patient as well as referring provider and/or PCP via electronic correspondence. The patient has received an after-visit summary and questions were answered concerning future plans. Nava Springer,   Staff Neurologist  Diplomate, American Board of Psychiatry & Neurology       CC Referring provider:  Ankit Garcia [id number] [facility]  [address] [facility],  [address] [place] [address]

## 2021-05-10 NOTE — PATIENT INSTRUCTIONS
PRESCRIPTION REFILL POLICY [facility] Statement to Patients April 1, 2014 In an effort to ensure the large volume of patient prescription refills is processed in the most efficient and expeditious manner, we are asking our patients to assist us by calling your Pharmacy for all prescription refills, this will include also your  Mail Order Pharmacy. The pharmacy will contact our office electronically to continue the refill process. Please do not wait until the last minute to call your pharmacy. We need at least 48 hours (2days) to fill prescriptions. We also encourage you to call your pharmacy before going to  your prescription to make sure it is ready. With regard to controlled substance prescription refill requests (narcotic refills) that need to be picked up at our office, we ask your cooperation by providing us with at least 72 hours (3days) notice that you will need a refill. We will not refill narcotic prescription refill requests after 4:00pm on any weekday, Monday through Thursday, or after 2:00pm on Fridays, or on the weekends. We encourage everyone to explore another way of getting your prescription refill request processed using [employer], our patient web portal through our electronic medical record system. [employer] is an efficient and effective way to communicate your medication request directly to the office and  downloadable as an jericho on your smart phone . [employer] also features a review functionality that allows you to view your medication list as well as leave messages for your physician. Are you ready to get connected? If so please review the attatched instructions or speak to any of our staff to get you set up right away! Thank you so much for your cooperation. Should you have any questions please contact our Practice Administrator. The Physicians and Staff,  [facility]

## 2021-05-14 LAB
A-TOCOPHEROL VIT E SERPL-MCNC: 10.3 MG/L (ref 7–25.1)
COPPER SERPL-MCNC: 128 UG/DL (ref 80–158)
GAMMA TOCOPHEROL SERPL-MCNC: 1.2 MG/L (ref 0.5–5.5)
VIT B1 BLD-SCNC: 116.7 NMOL/L (ref 66.5–200)
VIT B12 SERPL-MCNC: 524 PG/ML (ref 232–1245)

## 2022-03-18 PROBLEM — R06.09 DOE (DYSPNEA ON EXERTION): Status: ACTIVE | Noted: 2021-01-16

## 2022-03-19 PROBLEM — E78.2 MIXED HYPERLIPIDEMIA: Status: ACTIVE | Noted: 2017-01-17

## 2022-03-19 PROBLEM — G93.31 POSTVIRAL FATIGUE SYNDROME: Status: ACTIVE | Noted: 2021-01-16

## 2022-03-20 PROBLEM — Z86.16 HISTORY OF COVID-19: Status: ACTIVE | Noted: 2021-01-16

## 2022-03-20 PROBLEM — R73.03 PREDIABETES: Status: ACTIVE | Noted: 2017-01-17

## 2022-03-20 PROBLEM — R01.1 SYSTOLIC MURMUR: Status: ACTIVE | Noted: 2021-01-16

## 2022-04-08 ENCOUNTER — OFFICE VISIT (OUTPATIENT)
Dept: PRIMARY CARE CLINIC | Age: 62
End: 2022-04-08
Payer: COMMERCIAL

## 2022-04-08 VITALS
HEIGHT: 63 IN | TEMPERATURE: 97.8 F | HEART RATE: 65 BPM | BODY MASS INDEX: 34.41 KG/M2 | SYSTOLIC BLOOD PRESSURE: 128 MMHG | OXYGEN SATURATION: 98 % | DIASTOLIC BLOOD PRESSURE: 82 MMHG | WEIGHT: 194.2 LBS | RESPIRATION RATE: 15 BRPM

## 2022-04-08 DIAGNOSIS — Z00.00 PHYSICAL EXAM: Primary | ICD-10-CM

## 2022-04-08 DIAGNOSIS — R73.02 IMPAIRED GLUCOSE TOLERANCE: ICD-10-CM

## 2022-04-08 DIAGNOSIS — Z12.11 SCREENING FOR COLON CANCER: ICD-10-CM

## 2022-04-08 DIAGNOSIS — J30.89 ENVIRONMENTAL AND SEASONAL ALLERGIES: ICD-10-CM

## 2022-04-08 DIAGNOSIS — Z12.31 ENCOUNTER FOR SCREENING MAMMOGRAM FOR MALIGNANT NEOPLASM OF BREAST: ICD-10-CM

## 2022-04-08 DIAGNOSIS — M81.0 POSTMENOPAUSAL BONE LOSS: ICD-10-CM

## 2022-04-08 PROBLEM — R06.09 DOE (DYSPNEA ON EXERTION): Status: RESOLVED | Noted: 2021-01-16 | Resolved: 2022-04-08

## 2022-04-08 PROBLEM — Z90.710 S/P HYSTERECTOMY: Status: ACTIVE | Noted: 2022-04-08

## 2022-04-08 PROCEDURE — 99396 PREV VISIT EST AGE 40-64: CPT | Performed by: INTERNAL MEDICINE

## 2022-04-08 RX ORDER — MONTELUKAST SODIUM 10 MG/1
10 TABLET ORAL DAILY
Qty: 90 TABLET | Refills: 0 | Status: SHIPPED | OUTPATIENT
Start: 2022-04-08

## 2022-04-08 NOTE — PROGRESS NOTES
Kim Pulido (: 1960) is a 63 y.o. female, established patient, here for evaluation of the following chief complaint(s):  Physical (was on steriods due to Covid)      SUBJECTIVE/OBJECTIVE:  HPI  Patient seen today for Physical exam.    C/o seasonal/environmental allergies worsened by pollen this time of year. No wheezing, MORA. Dry cough sometimes at night. Needs Rx refilled. Hospitalized with COVID-19 at [facility] after testing positive in 2021. Following this, she had residual MORA, dizziness, gait abnormality requiring use of walker. She is followed by Dr Kim Houser (Pulmonologist) and Dr Mary Phillips (Neurologist). Today she reports these symptoms have resolved. Steady gait without assistance. She has lost ten pounds since last office visit. She reports being mindful of carbohydrate intake, eating more vegetables. Walks daily. Follows with gynecology. Vaginal dryness, dysparia symptoms resolving with vaginal cream.     Mammogram last year 3/2021, negative. Will order today    No history of DEXA scan,     Last colonoscopy done , negative. Reports feeling motivated to lose weight after taking steroids while recovering from COVID-19. She sleeps well, 7 hours per night. Patient Active Problem List   Diagnosis Code    Prediabetes R73.03    Mixed hyperlipidemia E78.2    History of COVID-19 Z86.16    Postviral fatigue syndrome J42.6    Systolic murmur K22.5    S/P hysterectomy Z90.710     Current Outpatient Medications   Medication Sig Dispense Refill    montelukast (SINGULAIR) 10 mg tablet Take 1 Tablet by mouth daily. 90 Tablet 0    multivitamin (ONE A DAY) tablet Take 1 Tab by mouth daily.  cholecalciferol (Vitamin D3) (1000 Units /25 mcg) tablet Take 2 Tabs by mouth daily.  60 Tab 0    DIVIGEL 1 mg (0.1 %) glpk USE 1 PACKET AS DIRECTED  11    PREMARIN 0.625 mg/gram vaginal cream INSERT 0.5GRAM VAGINALLY TWICE WEEKLY  1    ondansetron (ZOFRAN ODT) 4 mg disintegrating tablet Take 1 Tab by mouth every eight (8) hours as needed for Nausea or Vomiting. (Patient not taking: Reported on 4/8/2022) 10 Tab 0     Allergies   Allergen Reactions    Asa-Acetaminophen-Caff-Buffers Nausea and Vomiting    Aspirin Nausea and Vomiting    Pcn [Penicillins] Hives     Past Medical History:   Diagnosis Date    COVID-19     Environmental and seasonal allergies      Past Surgical History:   Procedure Laterality Date    HX GYN      hysterectomy      Family History   Problem Relation Age of Onset    Hypertension Mother     Diabetes Mother               Review of Systems   Constitutional: Negative for fever and unexpected weight change. HENT: Positive for rhinorrhea and sneezing. Negative for facial swelling, sore throat and tinnitus. Eyes: Positive for discharge. Negative for photophobia and visual disturbance. Respiratory: Positive for cough. Negative for chest tightness, shortness of breath, wheezing and stridor. Cardiovascular: Negative for chest pain and palpitations. Gastrointestinal: Negative for abdominal distention, abdominal pain, constipation, diarrhea and nausea. Endocrine: Negative for cold intolerance, heat intolerance, polydipsia, polyphagia and polyuria. Genitourinary: Negative for flank pain. Musculoskeletal: Negative for back pain, gait problem and myalgias. Neurological: Negative for dizziness, weakness, numbness and headaches. Objective:     Visit Vitals  /79 (BP 1 Location: Left arm)   Pulse 65   Temp 97.8 °F (36.6 °C)   Resp 15   Ht 5' 3\" (1.6 m)   Wt 194 lb 3.2 oz (88.1 kg)   SpO2 98%   BMI 34.40 kg/m²       Physical Exam  Constitutional:       General: She is not in acute distress. Appearance: She is obese.    HENT:      Right Ear: Tympanic membrane and ear canal normal.      Left Ear: Tympanic membrane and ear canal normal.      Mouth/Throat:      Mouth: Mucous membranes are moist.   Eyes:      Conjunctiva/sclera: Conjunctivae normal.      Pupils: Pupils are equal, round, and reactive to light. Cardiovascular:      Rate and Rhythm: Normal rate and regular rhythm. Pulses: Normal pulses. Heart sounds: Normal heart sounds. Pulmonary:      Effort: Pulmonary effort is normal. No respiratory distress. Breath sounds: No stridor. No wheezing. Abdominal:      General: There is no distension. Palpations: Abdomen is soft. There is no mass. Tenderness: There is no abdominal tenderness. Hernia: No hernia is present. Musculoskeletal:         General: No swelling or tenderness. Cervical back: Normal range of motion. Right lower leg: No edema. Left lower leg: No edema. Skin:     General: Skin is warm and dry. Neurological:      Mental Status: She is alert and oriented to person, place, and time. Sensory: No sensory deficit. Gait: Gait normal.   Psychiatric:         Mood and Affect: Mood normal.     ASSESSMENT/PLAN:  Below is the assessment and plan developed based on review of pertinent history, physical exam, labs, studies, and medications. 1. Physical exam   Labs reviewed. Continue limiting intake of breads, sweets, pastas and portion control. Encouraged patient to continue walking daily, at least 5,000 steps per day to maintain conditioning; 10,000 steps daily for weight loss. -     METABOLIC PANEL, COMPREHENSIVE; Future  -     CBC WITH AUTOMATED DIFF; Future  -     LIPID PANEL; Future  -     TSH 3RD GENERATION; Future  2. Encounter for screening mammogram for malignant neoplasm of breast  -     [facility] 3D DORI W MAMMO BI SCREENING INCL CAD; Future  3. Screening for colon cancer   Referral to Gastroenterology placed. 4. Postmenopausal bone loss  -     DEXA BONE DENSITY STUDY AXIAL; Future  5. Impaired glucose tolerance  -     HEMOGLOBIN A1C WITH EAG; Future  6. Environmental and seasonal allergies  -     montelukast (SINGULAIR) 10 mg tablet;  Take 1 Tablet by mouth daily., Normal, Disp-90 Tablet, R-0

## 2022-04-08 NOTE — PROGRESS NOTES
Chief Complaint   Patient presents with    Physical     was on steriods due to Covid       Visit Vitals  /79 (BP 1 Location: Left arm)   Pulse 65   Temp 97.8 °F (36.6 °C)   Resp 15   Ht 5' 3\" (1.6 m)   Wt 194 lb 3.2 oz (88.1 kg)   SpO2 98%   BMI 34.40 kg/m²       1. Have you been to the ER, urgent care clinic since your last visit? Hospitalized since your last visit? No    2. Have you seen or consulted any other health care providers outside of the [address] [place] [address] since your last visit? Include any pap smears or colon screening. Yes [facility]      3. For patients aged 39-70: Has the patient had a colonoscopy / FIT/ Cologuard? Yes - Care Gap present. Most recent result on file      If the patient is female:    4. For patients aged 41-77: Has the patient had a mammogram within the past 2 years? Yes - no Care Gap present      5. For patients aged 21-65: Has the patient had a pap smear?  No

## 2022-04-09 LAB
ALBUMIN SERPL-MCNC: 4.1 G/DL (ref 3.5–5)
ALBUMIN/GLOB SERPL: 1.1 {RATIO} (ref 1.1–2.2)
ALP SERPL-CCNC: 58 U/L (ref 45–117)
ALT SERPL-CCNC: 26 U/L (ref 12–78)
ANION GAP SERPL CALC-SCNC: 5 MMOL/L (ref 5–15)
AST SERPL-CCNC: 18 U/L (ref 15–37)
BASOPHILS # BLD: 0 K/UL (ref 0–0.1)
BASOPHILS NFR BLD: 0 % (ref 0–1)
BILIRUB SERPL-MCNC: 0.5 MG/DL (ref 0.2–1)
BUN SERPL-MCNC: 12 MG/DL (ref 6–20)
BUN/CREAT SERPL: 13 (ref 12–20)
CALCIUM SERPL-MCNC: 9.4 MG/DL (ref 8.5–10.1)
CHLORIDE SERPL-SCNC: 104 MMOL/L (ref 97–108)
CHOLEST SERPL-MCNC: 220 MG/DL
CO2 SERPL-SCNC: 29 MMOL/L (ref 21–32)
CREAT SERPL-MCNC: 0.96 MG/DL (ref 0.55–1.02)
DIFFERENTIAL METHOD BLD: ABNORMAL
EOSINOPHIL # BLD: 0.1 K/UL (ref 0–0.4)
EOSINOPHIL NFR BLD: 2 % (ref 0–7)
ERYTHROCYTE [DISTWIDTH] IN BLOOD BY AUTOMATED COUNT: 14.7 % (ref 11.5–14.5)
EST. AVERAGE GLUCOSE BLD GHB EST-MCNC: 114 MG/DL
GLOBULIN SER CALC-MCNC: 3.8 G/DL (ref 2–4)
GLUCOSE SERPL-MCNC: 89 MG/DL (ref 65–100)
HBA1C MFR BLD: 5.6 % (ref 4–5.6)
HCT VFR BLD AUTO: 44 % (ref 35–47)
HDLC SERPL-MCNC: 73 MG/DL
HDLC SERPL: 3 {RATIO} (ref 0–5)
HGB BLD-MCNC: 13.3 G/DL (ref 11.5–16)
IMM GRANULOCYTES # BLD AUTO: 0 K/UL (ref 0–0.04)
IMM GRANULOCYTES NFR BLD AUTO: 0 % (ref 0–0.5)
LDLC SERPL CALC-MCNC: 133.6 MG/DL (ref 0–100)
LYMPHOCYTES # BLD: 2.2 K/UL (ref 0.8–3.5)
LYMPHOCYTES NFR BLD: 49 % (ref 12–49)
MCH RBC QN AUTO: 25.9 PG (ref 26–34)
MCHC RBC AUTO-ENTMCNC: 30.2 G/DL (ref 30–36.5)
MCV RBC AUTO: 85.6 FL (ref 80–99)
MONOCYTES # BLD: 0.3 K/UL (ref 0–1)
MONOCYTES NFR BLD: 8 % (ref 5–13)
NEUTS SEG # BLD: 1.8 K/UL (ref 1.8–8)
NEUTS SEG NFR BLD: 41 % (ref 32–75)
NRBC # BLD: 0 K/UL (ref 0–0.01)
NRBC BLD-RTO: 0 PER 100 WBC
PLATELET # BLD AUTO: 275 K/UL (ref 150–400)
PMV BLD AUTO: 10.5 FL (ref 8.9–12.9)
POTASSIUM SERPL-SCNC: 4.3 MMOL/L (ref 3.5–5.1)
PROT SERPL-MCNC: 7.9 G/DL (ref 6.4–8.2)
RBC # BLD AUTO: 5.14 M/UL (ref 3.8–5.2)
SODIUM SERPL-SCNC: 138 MMOL/L (ref 136–145)
TRIGL SERPL-MCNC: 67 MG/DL (ref ?–150)
TSH SERPL DL<=0.05 MIU/L-ACNC: 1.54 UIU/ML (ref 0.36–3.74)
VLDLC SERPL CALC-MCNC: 13.4 MG/DL
WBC # BLD AUTO: 4.5 K/UL (ref 3.6–11)

## 2022-04-11 PROBLEM — R06.09 DOE (DYSPNEA ON EXERTION): Status: RESOLVED | Noted: 2021-01-16 | Resolved: 2022-04-08

## 2022-04-11 PROBLEM — Z90.710 S/P HYSTERECTOMY: Status: ACTIVE | Noted: 2022-04-08

## 2022-04-11 NOTE — PROGRESS NOTES
Good morning Ms Geovanna Edge,  It was great meeting you on Friday. I hope you had a nice weekend. I called and left a voicemail to review your lab results with you. Your thyroid level, complete blood count, hemoglobin A1c, and electrolytes all look good. Your total cholesterol came back elevated, but not a big change since the last time it was checked. I think it is worth discussing a cholesterol lowering medication. Do you have a family history of high cholesterol? Have you ever taken a statin medication for this? You can reply to this message or call us back and we can discuss over the phone. Thanks! Josephine

## 2022-08-17 ENCOUNTER — HOSPITAL ENCOUNTER (OUTPATIENT)
Dept: BONE DENSITY | Age: 62
Discharge: HOME | End: 2022-08-17
Payer: COMMERCIAL

## 2022-08-17 ENCOUNTER — HOSPITAL ENCOUNTER (OUTPATIENT)
Dept: MAMMOGRAPHY | Age: 62
Discharge: HOME | End: 2022-08-17
Payer: COMMERCIAL

## 2022-08-17 DIAGNOSIS — M81.0 POSTMENOPAUSAL BONE LOSS: ICD-10-CM

## 2022-08-17 DIAGNOSIS — Z12.31 ENCOUNTER FOR SCREENING MAMMOGRAM FOR MALIGNANT NEOPLASM OF BREAST: ICD-10-CM

## 2022-08-17 PROCEDURE — 77063 BREAST TOMOSYNTHESIS BI: CPT

## 2022-08-17 PROCEDURE — 77080 DXA BONE DENSITY AXIAL: CPT

## 2022-08-18 ENCOUNTER — TELEPHONE (OUTPATIENT)
Dept: PRIMARY CARE CLINIC | Age: 62
End: 2022-08-18

## 2022-09-29 NOTE — TELEPHONE ENCOUNTER
Spoke with patient about labs. Discussed still in prediabetes range. I did ask if she had ever been scanned for a blood clot which she was unsure. She did not have leg swelling at last visit. Had Echo with Cards. Her O2 sats have been normal and HR is in normal range at last visit but she is extremely short of breath even talking. This could be due to deconditioning but discussed there is concern for increased hypercoagulability in patients with COVID. Her Wells Score for PE is 0 (low probability). Discussed we could check a D Dimer and if positive we will schedule CTA before the weekend to check for clot. She is interested in doing this. Asked that I speak with  who states he can bring her back this afternoon to have the lab drawn.
abnormal

## 2024-04-22 ENCOUNTER — HOSPITAL ENCOUNTER (OUTPATIENT)
Age: 64
Discharge: HOME | End: 2024-04-25
Payer: COMMERCIAL

## 2024-04-22 ENCOUNTER — TRANSCRIBE ORDERS (OUTPATIENT)
Facility: HOSPITAL | Age: 64
End: 2024-04-22

## 2024-04-22 ENCOUNTER — OFFICE VISIT (OUTPATIENT)
Dept: PRIMARY CARE CLINIC | Facility: CLINIC | Age: 64
End: 2024-04-22
Payer: COMMERCIAL

## 2024-04-22 VITALS
HEART RATE: 64 BPM | HEIGHT: 63 IN | RESPIRATION RATE: 16 BRPM | BODY MASS INDEX: 37.39 KG/M2 | WEIGHT: 211 LBS | DIASTOLIC BLOOD PRESSURE: 85 MMHG | TEMPERATURE: 97.5 F | OXYGEN SATURATION: 100 % | SYSTOLIC BLOOD PRESSURE: 155 MMHG

## 2024-04-22 DIAGNOSIS — E66.01 SEVERE OBESITY (HCC): ICD-10-CM

## 2024-04-22 DIAGNOSIS — Z11.4 ENCOUNTER FOR SCREENING FOR HIV: ICD-10-CM

## 2024-04-22 DIAGNOSIS — Z00.00 PHYSICAL EXAM: Primary | ICD-10-CM

## 2024-04-22 DIAGNOSIS — E89.41 HOT FLASHES DUE TO SURGICAL MENOPAUSE: ICD-10-CM

## 2024-04-22 DIAGNOSIS — Z12.31 VISIT FOR SCREENING MAMMOGRAM: ICD-10-CM

## 2024-04-22 DIAGNOSIS — E04.1 THYROID NODULE: ICD-10-CM

## 2024-04-22 DIAGNOSIS — J30.2 SEASONAL ALLERGIES: ICD-10-CM

## 2024-04-22 DIAGNOSIS — R73.02 IGT (IMPAIRED GLUCOSE TOLERANCE): ICD-10-CM

## 2024-04-22 DIAGNOSIS — E78.2 MIXED HYPERLIPIDEMIA: ICD-10-CM

## 2024-04-22 DIAGNOSIS — R03.0 ELEVATED BLOOD PRESSURE READING WITHOUT DIAGNOSIS OF HYPERTENSION: ICD-10-CM

## 2024-04-22 DIAGNOSIS — Z12.31 VISIT FOR SCREENING MAMMOGRAM: Primary | ICD-10-CM

## 2024-04-22 DIAGNOSIS — Z00.00 PHYSICAL EXAM: ICD-10-CM

## 2024-04-22 LAB
ALBUMIN SERPL-MCNC: 3.7 G/DL (ref 3.5–5)
ALBUMIN/GLOB SERPL: 1 (ref 1.1–2.2)
ALP SERPL-CCNC: 58 U/L (ref 45–117)
ALT SERPL-CCNC: 19 U/L (ref 12–78)
ANION GAP SERPL CALC-SCNC: 1 MMOL/L (ref 5–15)
AST SERPL-CCNC: 18 U/L (ref 15–37)
BILIRUB SERPL-MCNC: 0.5 MG/DL (ref 0.2–1)
BUN SERPL-MCNC: 10 MG/DL (ref 6–20)
BUN/CREAT SERPL: 11 (ref 12–20)
CALCIUM SERPL-MCNC: 9.3 MG/DL (ref 8.5–10.1)
CHLORIDE SERPL-SCNC: 107 MMOL/L (ref 97–108)
CHOLEST SERPL-MCNC: 203 MG/DL
CO2 SERPL-SCNC: 28 MMOL/L (ref 21–32)
CREAT SERPL-MCNC: 0.92 MG/DL (ref 0.55–1.02)
ERYTHROCYTE [DISTWIDTH] IN BLOOD BY AUTOMATED COUNT: 14 % (ref 11.5–14.5)
EST. AVERAGE GLUCOSE BLD GHB EST-MCNC: 120 MG/DL
GLOBULIN SER CALC-MCNC: 3.6 G/DL (ref 2–4)
GLUCOSE SERPL-MCNC: 102 MG/DL (ref 65–100)
HBA1C MFR BLD: 5.8 % (ref 4–5.6)
HCT VFR BLD AUTO: 42.1 % (ref 35–47)
HDLC SERPL-MCNC: 67 MG/DL
HDLC SERPL: 3 (ref 0–5)
HGB BLD-MCNC: 12.9 G/DL (ref 11.5–16)
HIV 1+2 AB+HIV1 P24 AG SERPL QL IA: NONREACTIVE
HIV 1/2 RESULT COMMENT: NORMAL
LDLC SERPL CALC-MCNC: 120.8 MG/DL (ref 0–100)
MCH RBC QN AUTO: 26.1 PG (ref 26–34)
MCHC RBC AUTO-ENTMCNC: 30.6 G/DL (ref 30–36.5)
MCV RBC AUTO: 85.2 FL (ref 80–99)
NRBC # BLD: 0 K/UL (ref 0–0.01)
NRBC BLD-RTO: 0 PER 100 WBC
PLATELET # BLD AUTO: 282 K/UL (ref 150–400)
PMV BLD AUTO: 10 FL (ref 8.9–12.9)
POTASSIUM SERPL-SCNC: 4.1 MMOL/L (ref 3.5–5.1)
PROT SERPL-MCNC: 7.3 G/DL (ref 6.4–8.2)
RBC # BLD AUTO: 4.94 M/UL (ref 3.8–5.2)
SODIUM SERPL-SCNC: 136 MMOL/L (ref 136–145)
TRIGL SERPL-MCNC: 76 MG/DL
TSH SERPL DL<=0.05 MIU/L-ACNC: 1.44 UIU/ML (ref 0.36–3.74)
VLDLC SERPL CALC-MCNC: 15.2 MG/DL
WBC # BLD AUTO: 3.6 K/UL (ref 3.6–11)

## 2024-04-22 PROCEDURE — 99213 OFFICE O/P EST LOW 20 MIN: CPT | Performed by: INTERNAL MEDICINE

## 2024-04-22 PROCEDURE — 76536 US EXAM OF HEAD AND NECK: CPT

## 2024-04-22 PROCEDURE — 77063 BREAST TOMOSYNTHESIS BI: CPT

## 2024-04-22 PROCEDURE — 99396 PREV VISIT EST AGE 40-64: CPT | Performed by: INTERNAL MEDICINE

## 2024-04-22 SDOH — ECONOMIC STABILITY: FOOD INSECURITY: WITHIN THE PAST 12 MONTHS, THE FOOD YOU BOUGHT JUST DIDN'T LAST AND YOU DIDN'T HAVE MONEY TO GET MORE.: NEVER TRUE

## 2024-04-22 SDOH — ECONOMIC STABILITY: HOUSING INSECURITY
IN THE LAST 12 MONTHS, WAS THERE A TIME WHEN YOU DID NOT HAVE A STEADY PLACE TO SLEEP OR SLEPT IN A SHELTER (INCLUDING NOW)?: NO

## 2024-04-22 SDOH — ECONOMIC STABILITY: FOOD INSECURITY: WITHIN THE PAST 12 MONTHS, YOU WORRIED THAT YOUR FOOD WOULD RUN OUT BEFORE YOU GOT MONEY TO BUY MORE.: NEVER TRUE

## 2024-04-22 SDOH — ECONOMIC STABILITY: INCOME INSECURITY: HOW HARD IS IT FOR YOU TO PAY FOR THE VERY BASICS LIKE FOOD, HOUSING, MEDICAL CARE, AND HEATING?: NOT HARD AT ALL

## 2024-04-22 ASSESSMENT — PATIENT HEALTH QUESTIONNAIRE - PHQ9
SUM OF ALL RESPONSES TO PHQ QUESTIONS 1-9: 0
SUM OF ALL RESPONSES TO PHQ9 QUESTIONS 1 & 2: 0
SUM OF ALL RESPONSES TO PHQ QUESTIONS 1-9: 0
1. LITTLE INTEREST OR PLEASURE IN DOING THINGS: NOT AT ALL
2. FEELING DOWN, DEPRESSED OR HOPELESS: NOT AT ALL
SUM OF ALL RESPONSES TO PHQ QUESTIONS 1-9: 0
SUM OF ALL RESPONSES TO PHQ QUESTIONS 1-9: 0

## 2024-04-22 ASSESSMENT — ENCOUNTER SYMPTOMS
COLOR CHANGE: 0
BACK PAIN: 0
CONSTIPATION: 0
COUGH: 0
CHEST TIGHTNESS: 0
RHINORRHEA: 0
ABDOMINAL PAIN: 0
EYE DISCHARGE: 0
SHORTNESS OF BREATH: 0
SORE THROAT: 0
DIARRHEA: 0

## 2024-04-22 NOTE — PROGRESS NOTES
Health Decision Maker has been checked with the patient      Patient has stated that the scribe can come in room    Chief Complaint   Patient presents with    Annual Exam     Depression: Not at risk (4/22/2024)    PHQ-2     PHQ-2 Score: 0      BP (!) 171/81 (Site: Left Upper Arm)   Pulse 64   Temp 97.5 °F (36.4 °C)   Resp 16   Ht 1.6 m (5' 3\")   Wt 95.7 kg (211 lb)   SpO2 100%   BMI 37.38 kg/m²     \"Have you been to the ER, urgent care clinic since your last visit?  Hospitalized since your last visit?\"    NO    “Have you seen or consulted any other health care providers outside of [facility] since your last visit?”    NO     Desean Gould- JAYY      Specialist patient sees: None    Chart reviewed: immunizations are documented.   Immunization History   Administered Date(s) Administered    TDaP, ADACEL (age 10y-64y), BOOSTRIX (age 10y+), IM, 0.5mL 01/17/2017      
04/08/2022 1.8  1.8 - 8.0 K/UL Final    Lymphocytes Absolute 04/08/2022 2.2  0.8 - 3.5 K/UL Final    Monocytes Absolute 04/08/2022 0.3  0.0 - 1.0 K/UL Final    Eosinophils Absolute 04/08/2022 0.1  0.0 - 0.4 K/UL Final    Basophils Absolute 04/08/2022 0.0  0.0 - 0.1 K/UL Final    Granulocyte Absolute Count 04/08/2022 0.0  0.00 - 0.04 K/UL Final    Differential Type 04/08/2022 AUTOMATED    Final         Review of Systems   Constitutional:  Negative for activity change, fatigue and unexpected weight change.   HENT:  Negative for congestion, hearing loss, rhinorrhea and sore throat.    Eyes:  Negative for discharge.   Respiratory:  Negative for cough, chest tightness and shortness of breath.    Gastrointestinal:  Negative for abdominal pain, constipation and diarrhea.   Genitourinary:  Negative for dysuria, flank pain, frequency and urgency.   Musculoskeletal:  Negative for arthralgias, back pain and myalgias.   Skin:  Negative for color change and rash.   Allergic/Immunologic: Positive for environmental allergies.   Neurological:  Positive for headaches. Negative for dizziness and light-headedness.   Psychiatric/Behavioral:  Negative for dysphoric mood and sleep disturbance. The patient is not nervous/anxious.           BP (!) 155/85 (Site: Left Upper Arm, Position: Sitting)   Pulse 64   Temp 97.5 °F (36.4 °C)   Resp 16   Ht 1.6 m (5' 3\")   Wt 95.7 kg (211 lb)   SpO2 100%   BMI 37.38 kg/m²     Physical Exam  Vitals and nursing note reviewed.   Constitutional:       General: She is not in acute distress.     Appearance: Normal appearance. She is obese. She is not diaphoretic.   HENT:      Right Ear: Tympanic membrane, ear canal and external ear normal.      Left Ear: Tympanic membrane, ear canal and external ear normal.      Mouth/Throat:      Mouth: Mucous membranes are moist.      Pharynx: Oropharynx is clear.   Eyes:      General:         Right eye: No discharge.         Left eye: No discharge.

## 2024-04-25 ENCOUNTER — TELEPHONE (OUTPATIENT)
Dept: PRIMARY CARE CLINIC | Facility: CLINIC | Age: 64
End: 2024-04-25

## 2024-04-25 NOTE — TELEPHONE ENCOUNTER
Called patient, no answer left vm to call office back.     Was calling per Dr. Campos to see if patient was aware of recent Mammogram results. And that additional imaging was recommended.     Per Patients upcoming appointments in the chart - additional imaging is scheduled for May 16th, 2024.

## 2024-04-29 DIAGNOSIS — E04.1 CYSTIC THYROID NODULE: Primary | ICD-10-CM

## 2024-05-16 ENCOUNTER — HOSPITAL ENCOUNTER (OUTPATIENT)
Facility: HOSPITAL | Age: 64
Discharge: HOME | End: 2024-05-16
Payer: COMMERCIAL

## 2024-05-16 ENCOUNTER — HOSPITAL ENCOUNTER (OUTPATIENT)
Facility: HOSPITAL | Age: 64
End: 2024-05-16
Payer: COMMERCIAL

## 2024-05-16 DIAGNOSIS — R92.8 ABNORMAL MAMMOGRAM: ICD-10-CM

## 2024-05-16 PROCEDURE — G0279 TOMOSYNTHESIS, MAMMO: HCPCS

## 2024-05-16 PROCEDURE — 76642 ULTRASOUND BREAST LIMITED: CPT

## 2025-04-15 ENCOUNTER — TRANSCRIBE ORDERS (OUTPATIENT)
Facility: HOSPITAL | Age: 65
End: 2025-04-15

## 2025-04-15 DIAGNOSIS — Z12.31 VISIT FOR SCREENING MAMMOGRAM: Primary | ICD-10-CM

## 2025-04-23 ENCOUNTER — OFFICE VISIT (OUTPATIENT)
Dept: PRIMARY CARE CLINIC | Facility: CLINIC | Age: 65
End: 2025-04-23
Payer: COMMERCIAL

## 2025-04-23 ENCOUNTER — HOSPITAL ENCOUNTER (OUTPATIENT)
Age: 65
Discharge: HOME | End: 2025-04-26
Payer: COMMERCIAL

## 2025-04-23 ENCOUNTER — RESULTS FOLLOW-UP (OUTPATIENT)
Dept: PRIMARY CARE CLINIC | Facility: CLINIC | Age: 65
End: 2025-04-23

## 2025-04-23 VITALS
TEMPERATURE: 97.9 F | BODY MASS INDEX: 36.89 KG/M2 | WEIGHT: 208.2 LBS | OXYGEN SATURATION: 99 % | DIASTOLIC BLOOD PRESSURE: 77 MMHG | SYSTOLIC BLOOD PRESSURE: 136 MMHG | HEART RATE: 71 BPM | RESPIRATION RATE: 18 BRPM | HEIGHT: 63 IN

## 2025-04-23 VITALS — HEIGHT: 63 IN | BODY MASS INDEX: 32.78 KG/M2 | WEIGHT: 185 LBS

## 2025-04-23 DIAGNOSIS — E66.9 OBESITY (BMI 30-39.9): ICD-10-CM

## 2025-04-23 DIAGNOSIS — Z12.31 VISIT FOR SCREENING MAMMOGRAM: ICD-10-CM

## 2025-04-23 DIAGNOSIS — E04.1 THYROID NODULE: Primary | ICD-10-CM

## 2025-04-23 DIAGNOSIS — Z00.00 WELL WOMAN EXAM (NO GYNECOLOGICAL EXAM): ICD-10-CM

## 2025-04-23 DIAGNOSIS — Z11.3 SCREENING FOR STD (SEXUALLY TRANSMITTED DISEASE): ICD-10-CM

## 2025-04-23 PROCEDURE — 99213 OFFICE O/P EST LOW 20 MIN: CPT | Performed by: FAMILY MEDICINE

## 2025-04-23 PROCEDURE — 77063 BREAST TOMOSYNTHESIS BI: CPT

## 2025-04-23 PROCEDURE — 99396 PREV VISIT EST AGE 40-64: CPT | Performed by: FAMILY MEDICINE

## 2025-04-23 SDOH — ECONOMIC STABILITY: FOOD INSECURITY: WITHIN THE PAST 12 MONTHS, YOU WORRIED THAT YOUR FOOD WOULD RUN OUT BEFORE YOU GOT MONEY TO BUY MORE.: NEVER TRUE

## 2025-04-23 SDOH — ECONOMIC STABILITY: FOOD INSECURITY: WITHIN THE PAST 12 MONTHS, THE FOOD YOU BOUGHT JUST DIDN'T LAST AND YOU DIDN'T HAVE MONEY TO GET MORE.: NEVER TRUE

## 2025-04-23 ASSESSMENT — PATIENT HEALTH QUESTIONNAIRE - PHQ9
SUM OF ALL RESPONSES TO PHQ QUESTIONS 1-9: 0
SUM OF ALL RESPONSES TO PHQ QUESTIONS 1-9: 0
2. FEELING DOWN, DEPRESSED OR HOPELESS: NOT AT ALL
SUM OF ALL RESPONSES TO PHQ QUESTIONS 1-9: 0
1. LITTLE INTEREST OR PLEASURE IN DOING THINGS: NOT AT ALL
SUM OF ALL RESPONSES TO PHQ QUESTIONS 1-9: 0

## 2025-04-23 NOTE — PROGRESS NOTES
Health Decision Maker has been checked with the patient      AI form was signed    Chief Complaint   Patient presents with    Annual Exam       \"Have you been to the ER, urgent care clinic since your last visit?  Hospitalized since your last visit?\"    NO    “Have you seen or consulted any other health care providers outside of [facility] since your last visit?”    NO      Vitals:    04/23/25 1226   Resp: 18   Temp: 97.9 °F (36.6 °C)   TempSrc: Oral   Weight: 94.4 kg (208 lb 3.2 oz)   Height: 1.6 m (5' 3\")      Depression: Not at risk (4/23/2025)    PHQ-2     PHQ-2 Score: 0              Click Here for Release of Records Request    Chart reviewed: immunizations are documented.   Immunization History   Administered Date(s) Administered    COVID-19, MODERNA Bivalent, (age 12y+), IM, 50 mcg/0.5 mL 10/19/2022, 10/09/2023, 09/09/2024    COVID-19, PFIZER PURPLE top, DILUTE for use, (age 12 y+), 30mcg/0.3mL 03/31/2021, 04/21/2021, 11/08/2021    Influenza Virus Vaccine 10/09/2023    MMR, PRIORIX, M-M-R II, (age 12m+), SC, 0.5mL 01/13/2022    TDaP, ADACEL (age 10y-64y), BOOSTRIX (age 10y+), IM, 0.5mL 01/17/2017

## 2025-04-23 NOTE — PROGRESS NOTES
HPI     Chief Complaint   Patient presents with    Annual Exam       History of Present Illness  65-year-old female presents for annual exam.    Maintains balanced diet with meat and fish, no restrictions. Recently joined boot camp, reports benefit. Aims to avoid maintenance medications. Weight gain since COVID-19 pandemic, pursuing aggressive management strategies. Requests comprehensive blood work. No smoking or heavy alcohol use.    Completed mammogram today; colon cancer screening in 2022, follow-up in 10 years. Requests HIV and STD screening. Severe COVID-19 bout with 40-day hospitalization in 2020, significant shortness of breath, dark urine that has since resolved.    Reports abdominal muscle soreness from strenuous activity/ exercise recently. Concern about thyroid issues; history of thyroid nodules, repeat neck ultrasound previously ordered but not completed.    SOCIAL HISTORY  No smoking or heavy alcohol use.     Reviewed PmHx, RxHx, FmHx, SocHx, AllgHx and updated and dated in the chart.    Physical Exam:  /77   Pulse 71   Temp 97.9 °F (36.6 °C) (Oral)   Resp 18   Ht 1.6 m (5' 3\")   Wt 94.4 kg (208 lb 3.2 oz)   SpO2 99%   BMI 36.88 kg/m²     Physical Exam  WNWD, NAD  RRR, no murmur  CTA, no wheezes/ ronchi/ rales  Abd soft, nttp  Tms nml, pharynx nml, nose nml  No cervical LAD  No edema  No focal deficits  Mood/ affect nml        Results          No results found for this or any previous visit (from the past 12 hours).}        Assessment / Plan       ICD-10-CM    1. Thyroid nodule  E04.1 TSH     T4, Free     T3     US HEAD NECK SOFT TISSUE      2. Well woman exam (no gynecological exam)  Z00.00 CBC     Comprehensive Metabolic Panel     Lipid Panel     Hemoglobin A1C      3. Obesity (BMI 30-39.9)  E66.9 External Referral To Nutrition Services      4. Screening for STD (sexually transmitted disease)  Z11.3 Hepatitis B Surface Antigen     Hepatitis C Antibody     RPR     HIV 1/2 Ag/Ab, 4TH

## 2025-04-29 ENCOUNTER — HOSPITAL ENCOUNTER (OUTPATIENT)
Age: 65
Discharge: HOME | End: 2025-05-02
Payer: COMMERCIAL

## 2025-04-29 DIAGNOSIS — E04.1 THYROID NODULE: ICD-10-CM

## 2025-04-29 DIAGNOSIS — Z11.3 SCREENING FOR STD (SEXUALLY TRANSMITTED DISEASE): ICD-10-CM

## 2025-04-29 DIAGNOSIS — Z00.00 WELL WOMAN EXAM (NO GYNECOLOGICAL EXAM): ICD-10-CM

## 2025-04-29 PROCEDURE — 76536 US EXAM OF HEAD AND NECK: CPT

## 2025-04-30 LAB
ALBUMIN SERPL-MCNC: 4 G/DL (ref 3.5–5)
ALBUMIN/GLOB SERPL: 1.1 (ref 1.1–2.2)
ALP SERPL-CCNC: 60 U/L (ref 45–117)
ALT SERPL-CCNC: 25 U/L (ref 12–78)
ANION GAP SERPL CALC-SCNC: 9 MMOL/L (ref 2–12)
AST SERPL-CCNC: 30 U/L (ref 15–37)
BILIRUB SERPL-MCNC: 0.6 MG/DL (ref 0.2–1)
BUN SERPL-MCNC: 11 MG/DL (ref 6–20)
BUN/CREAT SERPL: 11 (ref 12–20)
CALCIUM SERPL-MCNC: 9.6 MG/DL (ref 8.5–10.1)
CHLORIDE SERPL-SCNC: 106 MMOL/L (ref 97–108)
CHOLEST SERPL-MCNC: 213 MG/DL
CO2 SERPL-SCNC: 24 MMOL/L (ref 21–32)
CREAT SERPL-MCNC: 0.98 MG/DL (ref 0.55–1.02)
ERYTHROCYTE [DISTWIDTH] IN BLOOD BY AUTOMATED COUNT: 14.5 % (ref 11.5–14.5)
EST. AVERAGE GLUCOSE BLD GHB EST-MCNC: 120 MG/DL
GLOBULIN SER CALC-MCNC: 3.5 G/DL (ref 2–4)
GLUCOSE SERPL-MCNC: 87 MG/DL (ref 65–100)
HBA1C MFR BLD: 5.8 % (ref 4–5.6)
HBV SURFACE AG SER QL: <0.1 INDEX
HBV SURFACE AG SER QL: NEGATIVE
HCT VFR BLD AUTO: 40.1 % (ref 35–47)
HCV AB SER IA-ACNC: 0.07 INDEX
HCV AB SERPL QL IA: NONREACTIVE
HDLC SERPL-MCNC: 73 MG/DL
HDLC SERPL: 2.9 (ref 0–5)
HGB BLD-MCNC: 12.6 G/DL (ref 11.5–16)
HIV 1+2 AB+HIV1 P24 AG SERPL QL IA: NONREACTIVE
HIV 1/2 RESULT COMMENT: NORMAL
LDLC SERPL CALC-MCNC: 126 MG/DL (ref 0–100)
MCH RBC QN AUTO: 26.3 PG (ref 26–34)
MCHC RBC AUTO-ENTMCNC: 31.4 G/DL (ref 30–36.5)
MCV RBC AUTO: 83.7 FL (ref 80–99)
NRBC # BLD: 0 K/UL (ref 0–0.01)
NRBC BLD-RTO: 0 PER 100 WBC
PLATELET # BLD AUTO: 303 K/UL (ref 150–400)
PMV BLD AUTO: 10.6 FL (ref 8.9–12.9)
POTASSIUM SERPL-SCNC: 3.9 MMOL/L (ref 3.5–5.1)
PROT SERPL-MCNC: 7.5 G/DL (ref 6.4–8.2)
RBC # BLD AUTO: 4.79 M/UL (ref 3.8–5.2)
RPR SER QL: NONREACTIVE
SODIUM SERPL-SCNC: 139 MMOL/L (ref 136–145)
T3 SERPL-MCNC: 128 NG/DL (ref 71–180)
T4 FREE SERPL-MCNC: 1.1 NG/DL (ref 0.8–1.5)
TRIGL SERPL-MCNC: 70 MG/DL
TSH SERPL DL<=0.05 MIU/L-ACNC: 1.21 UIU/ML (ref 0.36–3.74)
VLDLC SERPL CALC-MCNC: 14 MG/DL
WBC # BLD AUTO: 3.8 K/UL (ref 3.6–11)

## 2025-05-01 ENCOUNTER — RESULTS FOLLOW-UP (OUTPATIENT)
Dept: PRIMARY CARE CLINIC | Facility: CLINIC | Age: 65
End: 2025-05-01

## 2025-05-01 LAB
C TRACH RRNA SPEC QL NAA+PROBE: NEGATIVE
N GONORRHOEA RRNA SPEC QL NAA+PROBE: NEGATIVE
SPECIMEN SOURCE: NORMAL